# Patient Record
Sex: MALE | Race: WHITE | NOT HISPANIC OR LATINO | Employment: OTHER | URBAN - METROPOLITAN AREA
[De-identification: names, ages, dates, MRNs, and addresses within clinical notes are randomized per-mention and may not be internally consistent; named-entity substitution may affect disease eponyms.]

---

## 2017-02-06 ENCOUNTER — OFFICE VISIT (OUTPATIENT)
Dept: URGENT CARE | Facility: PHYSICIAN GROUP | Age: 47
End: 2017-02-06
Payer: COMMERCIAL

## 2017-02-06 VITALS
OXYGEN SATURATION: 96 % | TEMPERATURE: 99 F | DIASTOLIC BLOOD PRESSURE: 76 MMHG | WEIGHT: 250 LBS | SYSTOLIC BLOOD PRESSURE: 128 MMHG | BODY MASS INDEX: 34.88 KG/M2 | RESPIRATION RATE: 20 BRPM | HEART RATE: 94 BPM

## 2017-02-06 DIAGNOSIS — J06.9 VIRAL UPPER RESPIRATORY TRACT INFECTION: ICD-10-CM

## 2017-02-06 DIAGNOSIS — Z87.09 HISTORY OF ACUTE SINUSITIS: ICD-10-CM

## 2017-02-06 DIAGNOSIS — J45.31 MILD PERSISTENT ASTHMA WITH ACUTE EXACERBATION: ICD-10-CM

## 2017-02-06 DIAGNOSIS — J98.01 ACUTE BRONCHOSPASM DUE TO VIRAL INFECTION: ICD-10-CM

## 2017-02-06 DIAGNOSIS — B34.9 ACUTE BRONCHOSPASM DUE TO VIRAL INFECTION: ICD-10-CM

## 2017-02-06 PROCEDURE — 99214 OFFICE O/P EST MOD 30 MIN: CPT | Performed by: EMERGENCY MEDICINE

## 2017-02-06 RX ORDER — DOXYCYCLINE HYCLATE 100 MG
100 TABLET ORAL 2 TIMES DAILY
Qty: 14 TAB | Refills: 0 | Status: SHIPPED | OUTPATIENT
Start: 2017-02-06 | End: 2024-01-01

## 2017-02-06 RX ORDER — PREDNISONE 20 MG/1
40 TABLET ORAL DAILY
Qty: 14 TAB | Refills: 0 | Status: SHIPPED | OUTPATIENT
Start: 2017-02-06 | End: 2017-02-13

## 2017-02-06 RX ORDER — CODEINE PHOSPHATE AND GUAIFENESIN 10; 100 MG/5ML; MG/5ML
10 SOLUTION ORAL NIGHTLY PRN
Qty: 50 ML | Refills: 0 | Status: SHIPPED | OUTPATIENT
Start: 2017-02-06 | End: 2017-03-14 | Stop reason: SDUPTHER

## 2017-02-06 RX ORDER — BENZONATATE 100 MG/1
100 CAPSULE ORAL 3 TIMES DAILY PRN
Qty: 20 CAP | Refills: 0 | Status: SHIPPED | OUTPATIENT
Start: 2017-02-06

## 2017-02-06 ASSESSMENT — ENCOUNTER SYMPTOMS
HEMOPTYSIS: 0
SORE THROAT: 1
SHORTNESS OF BREATH: 0
COUGH: 1
ABDOMINAL PAIN: 0
CHILLS: 0
NAUSEA: 0
HEADACHES: 1
DIARRHEA: 0
WHEEZING: 1
RHINORRHEA: 1
SINUS PAIN: 1
VOMITING: 0
SPUTUM PRODUCTION: 1
MYALGIAS: 0

## 2017-02-06 NOTE — MR AVS SNAPSHOT
Tima Dedrick Ibrahima   2017 9:00 AM   Office Visit   MRN: [id number]    Department:  [facility]   Dept Phone:  [phone]    Description:  Male : 1970   Provider:  Ken Guerrero M.D.           Reason for Visit     Cough congestion, fever(subjective), body aches x 4 days      Allergies as of 2017     Allergen Noted Reactions    Pcn [Penicillins] 10/12/2009   Swelling      You were diagnosed with     Mild persistent asthma with acute exacerbation   [036708]       Acute bronchospasm due to viral infection   [080953]       Viral upper respiratory tract infection   [133655]       History of acute sinusitis   [638190]         Vital Signs     Blood Pressure Pulse Temperature Respirations Weight Oxygen Saturation    128/76 mmHg 94 37.2 °C (99 °F) 20 113.399 kg (250 lb) 96%    Smoking Status                   Former Smoker           Basic Information     Date Of Birth Sex Race Ethnicity Preferred Language    1970 Male White Non- English      Problem List              ICD-10-CM Priority Class Noted - Resolved    SVT (supraventricular tachycardia) (CMS-HCC) I47.1   2011 - Present    Elevated lipids E78.5   2011 - Present    Sleep apnea G47.30   2011 - Present    Personal history of prior ablation treatment (Chronic) Z98.890   Unknown - Present    Chest discomfort R07.89   2013 - Present    Dyslipidemia E78.5   2013 - Present    Family history of premature CAD Z82.49   2013 - Present    HTN (hypertension) I10   2013 - Present      Health Maintenance        Date Due Completion Dates    IMM INFLUENZA (1) 2016 ---    IMM DTaP/Tdap/Td Vaccine (2 - Td) 3/17/2024 3/17/2014            Current Immunizations     Tdap Vaccine 3/17/2014      Below and/or attached are the medications your provider expects you to take. Review all of your home medications and newly ordered medications with your provider and/or pharmacist. Follow medication instructions as  directed by your provider and/or pharmacist. Please keep your medication list with you and share with your provider. Update the information when medications are discontinued, doses are changed, or new medications (including over-the-counter products) are added; and carry medication information at all times in the event of emergency situations     Allergies:  PCN - Swelling               Medications  Valid as of: February 06, 2017 -  9:34 AM    Generic Name Brand Name Tablet Size Instructions for use    ALPRAZolam (Tab) XANAX 0.25 MG Take 1 Tab by mouth as needed for Anxiety.        ALPRAZolam (Tab) XANAX 0.5 MG         Atorvastatin Calcium (Tab) LIPITOR 10 MG Take 10 mg by mouth every evening.        Benzonatate (Cap) TESSALON 100 MG Take 1 Cap by mouth 3 times a day as needed.        Doxycycline Hyclate (Tab) VIBRAMYCIN 100 MG Take 1 Tab by mouth 2 times a day.        Doxycycline Hyclate (Tab) VIBRAMYCIN 100 MG Take 1 Tab by mouth 2 times a day.        Fluticasone-Salmeterol (AEROSOL POWDER, BREATH ACTIVATED) ADVAIR DISKUS 250-50 MCG/DOSE         GuaiFENesin (TABLET SR 12 HR) MUCINEX 600 MG Take 600 mg by mouth every 12 hours.        Guaifenesin-Codeine (Solution) ROBITUSSIN -10 mg/5mL Take 10 mL by mouth at bedtime as needed for Cough.        Melatonin   by Does not apply route every 48 hours as needed.        Olmesartan Medoxomil (Tab) BENICAR 20 MG         Omega-3 Fatty Acids   Take 2,000 mg by mouth every day.        PredniSONE (Tab) DELTASONE 20 MG Take 2 Tabs by mouth every day for 7 days.        Zolpidem Tartrate   Take  by mouth.        .                 Medicines prescribed today were sent to:     [id number] [place], [address] [place] AT [address] [facility] [address]    Phone: [phone] Fax: [phone]    Open 24 Hours?: No      Medication refill instructions:       If your prescription bottle indicates you have medication refills  left, it is not necessary to call your provider’s office. Please contact your pharmacy and they will refill your medication.    If your prescription bottle indicates you do not have any refills left, you may request refills at any time through one of the following ways: The online [employer] system (except Urgent Care), by calling your provider’s office, or by asking your pharmacy to contact your provider’s office with a refill request. Medication refills are processed only during regular business hours and may not be available until the next business day. Your provider may request additional information or to have a follow-up visit with you prior to refilling your medication.   *Please Note: Medication refills are assigned a new Rx number when refilled electronically. Your pharmacy may indicate that no refills were authorized even though a new prescription for the same medication is available at the pharmacy. Please request the medicine by name with the pharmacy before contacting your provider for a refill.        Instructions    Use over-the-counter oxymetazoline (Afrin) nasal spray as needed for up to 3 days only; follow package instructions for dosing.  Use saline nasal irrigation, such as with a Neti Pot, as needed daily for relief of nasal or sinus congestion relief.  Use the prescribed antibiotic only if symptoms persist beyond 7-10 days, or re-worsen. Use an oral probiotic daily, such as Culturelle, Align, or yogurt to reduce gastrointestinal symptoms.    Asthma, Acute Bronchospasm  Acute bronchospasm caused by asthma is also referred to as an asthma attack. Bronchospasm means your air passages become narrowed. The narrowing is caused by inflammation and tightening of the muscles in the air tubes (bronchi) in your lungs. This can make it hard to breathe or cause you to wheeze and cough.  CAUSES  Possible triggers are:  · Animal dander from the skin, hair, or feathers of animals.  · Dust mites contained in house  dust.  · Cockroaches.  · Pollen from trees or grass.  · Mold.  · Cigarette or tobacco smoke.  · Air pollutants such as dust, household , hair sprays, aerosol sprays, paint fumes, strong chemicals, or strong odors.  · Cold air or weather changes. Cold air may trigger inflammation. Winds increase molds and pollens in the air.  · Strong emotions such as crying or laughing hard.  · Stress.  · Certain medicines such as aspirin or beta-blockers.  · Sulfites in foods and drinks, such as dried fruits and wine.  · Infections or inflammatory conditions, such as a flu, cold, or inflammation of the nasal membranes (rhinitis).  · Gastroesophageal reflux disease (GERD). GERD is a condition where stomach acid backs up into your esophagus.  · Exercise or strenuous activity.  SIGNS AND SYMPTOMS   · Wheezing.  · Excessive coughing, particularly at night.  · Chest tightness.  · Shortness of breath.  DIAGNOSIS   Your health care provider will ask you about your medical history and perform a physical exam. A chest X-ray or blood testing may be performed to look for other causes of your symptoms or other conditions that may have triggered your asthma attack.   TREATMENT   Treatment is aimed at reducing inflammation and opening up the airways in your lungs.  Most asthma attacks are treated with inhaled medicines. These include quick relief or rescue medicines (such as bronchodilators) and controller medicines (such as inhaled corticosteroids). These medicines are sometimes given through an inhaler or a nebulizer. Systemic steroid medicine taken by mouth or given through an IV tube also can be used to reduce the inflammation when an attack is moderate or severe. Antibiotic medicines are only used if a bacterial infection is present.   HOME CARE INSTRUCTIONS   · Rest.  · Drink plenty of liquids. This helps the mucus to remain thin and be easily coughed up. Only use caffeine in moderation and do not use alcohol until you have  recovered from your illness.  · Do not smoke. Avoid being exposed to secondhand smoke.  · You play a critical role in keeping yourself in good health. Avoid exposure to things that cause you to wheeze or to have breathing problems.  · Keep your medicines up-to-date and available. Carefully follow your health care provider's treatment plan.  · Take your medicine exactly as prescribed.  · When pollen or pollution is bad, keep windows closed and use an air conditioner or go to places with air conditioning.  · Asthma requires careful medical care. See your health care provider for a follow-up as advised. If you are more than 24 weeks pregnant and you were prescribed any new medicines, let your obstetrician know about the visit and how you are doing. Follow up with your health care provider as directed.  · After you have recovered from your asthma attack, make an appointment with your outpatient doctor to talk about ways to reduce the likelihood of future attacks. If you do not have a doctor who manages your asthma, make an appointment with a primary care doctor to discuss your asthma.  SEEK IMMEDIATE MEDICAL CARE IF:   · You are getting worse.  · You have trouble breathing. If severe, call your local emergency services (911 in the [place]  · You develop chest pain or discomfort.  · You are vomiting.  · You are not able to keep fluids down.  · You are coughing up yellow, green, brown, or bloody sputum.  · You have a fever and your symptoms suddenly get worse.  · You have trouble swallowing.  MAKE SURE YOU:   · Understand these instructions.  · Will watch your condition.  · Will get help right away if you are not doing well or get worse.     This information is not intended to replace advice given to you by your health care provider. Make sure you discuss any questions you have with your health care provider.     Document Released: 04/03/2008 Document Revised: 12/23/2014 Document Reviewed: 06/25/2014  [employer]  Patient Education ©2016 Elsevier Inc.  Sinusitis, Adult  Sinusitis is redness, soreness, and inflammation of the paranasal sinuses. Paranasal sinuses are air pockets within the bones of your face. They are located beneath your eyes, in the middle of your forehead, and above your eyes. In healthy paranasal sinuses, mucus is able to drain out, and air is able to circulate through them by way of your nose. However, when your paranasal sinuses are inflamed, mucus and air can become trapped. This can allow bacteria and other germs to grow and cause infection.  Sinusitis can develop quickly and last only a short time (acute) or continue over a long period (chronic). Sinusitis that lasts for more than 12 weeks is considered chronic.  CAUSES  Causes of sinusitis include:  · Allergies.  · Structural abnormalities, such as displacement of the cartilage that separates your nostrils (deviated septum), which can decrease the air flow through your nose and sinuses and affect sinus drainage.  · Functional abnormalities, such as when the small hairs (cilia) that line your sinuses and help remove mucus do not work properly or are not present.  SIGNS AND SYMPTOMS  Symptoms of acute and chronic sinusitis are the same. The primary symptoms are pain and pressure around the affected sinuses. Other symptoms include:  · Upper toothache.  · Earache.  · Headache.  · Bad breath.  · Decreased sense of smell and taste.  · A cough, which worsens when you are lying flat.  · Fatigue.  · Fever.  · Thick drainage from your nose, which often is green and may contain pus (purulent).  · Swelling and warmth over the affected sinuses.  DIAGNOSIS  Your health care provider will perform a physical exam. During your exam, your health care provider may perform any of the following to help determine if you have acute sinusitis or chronic sinusitis:  · Look in your nose for signs of abnormal growths in your nostrils (nasal polyps).  · Tap over the affected  sinus to check for signs of infection.  · View the inside of your sinuses using an imaging device that has a light attached (endoscope).  If your health care provider suspects that you have chronic sinusitis, one or more of the following tests may be recommended:  · Allergy tests.  · Nasal culture. A sample of mucus is taken from your nose, sent to a lab, and screened for bacteria.  · Nasal cytology. A sample of mucus is taken from your nose and examined by your health care provider to determine if your sinusitis is related to an allergy.  TREATMENT  Most cases of acute sinusitis are related to a viral infection and will resolve on their own within 10 days. Sometimes, medicines are prescribed to help relieve symptoms of both acute and chronic sinusitis. These may include pain medicines, decongestants, nasal steroid sprays, or saline sprays.  However, for sinusitis related to a bacterial infection, your health care provider will prescribe antibiotic medicines. These are medicines that will help kill the bacteria causing the infection.  Rarely, sinusitis is caused by a fungal infection. In these cases, your health care provider will prescribe antifungal medicine.  For some cases of chronic sinusitis, surgery is needed. Generally, these are cases in which sinusitis recurs more than 3 times per year, despite other treatments.  HOME CARE INSTRUCTIONS  · Drink plenty of water. Water helps thin the mucus so your sinuses can drain more easily.  · Use a humidifier.  · Inhale steam 3-4 times a day (for example, sit in the bathroom with the shower running).  · Apply a warm, moist washcloth to your face 3-4 times a day, or as directed by your health care provider.  · Use saline nasal sprays to help moisten and clean your sinuses.  · Take medicines only as directed by your health care provider.  · If you were prescribed either an antibiotic or antifungal medicine, finish it all even if you start to feel better.  SEEK IMMEDIATE  MEDICAL CARE IF:  · You have increasing pain or severe headaches.  · You have nausea, vomiting, or drowsiness.  · You have swelling around your face.  · You have vision problems.  · You have a stiff neck.  · You have difficulty breathing.     This information is not intended to replace advice given to you by your health care provider. Make sure you discuss any questions you have with your health care provider.     Document Released: 12/18/2006 Document Revised: 01/08/2016 Document Reviewed: 01/01/2013  [employer] Interactive Patient Education ©2016 Elsevier Inc.            eTec Access Code: [email or web address]  Expires: 3/8/2017  9:34 AM    eTec  A secure, online tool to manage your health information     [employer]’s eTec® is a secure, online tool that connects you to your personalized health information from the privacy of your home -- day or night - making it very easy for you to manage your healthcare. Once the activation process is completed, you can even access your medical information using the eTec anny, which is available for free in the [employer] Anny store or Google Play store.     eTec provides the following levels of access (as shown below):   My Chart Features   [facility] Doctor [facility]  Specialists [facility]  [facility]  Primary Care  Doctor   Email your healthcare team securely and privately 24/7 X X X    Manage appointments: schedule your next appointment; view details of past/upcoming appointments X      Request prescription refills. X      View recent personal medical records, including lab and immunizations X X X X   View health record, including health history, allergies, medications X X X X   Read reports about your outpatient visits, procedures, consult and ER notes X X X X   See your discharge summary, which is a recap of your hospital and/or ER visit that includes your diagnosis, lab results, and care plan. X X       How to register for eTec:  1. Go to   [email or web address].  2. Click on the Sign Up Now box, which takes you to the New Member Sign Up page. You will need to provide the following information:  a. Enter your [employer] Access Code exactly as it appears at the top of this page. (You will not need to use this code after you’ve completed the sign-up process. If you do not sign up before the expiration date, you must request a new code.)   b. Enter your date of birth.   c. Enter your home email address.   d. Click Submit, and follow the next screen’s instructions.  3. Create a [employer] ID. This will be your [employer] login ID and cannot be changed, so think of one that is secure and easy to remember.  4. Create a [employer] password. You can change your password at any time.  5. Enter your Password Reset Question and Answer. This can be used at a later time if you forget your password.   6. Enter your e-mail address. This allows you to receive e-mail notifications when new information is available in [employer].  7. Click Sign Up. You can now view your health information.    For assistance activating your [employer] account, call [phone]

## 2017-02-06 NOTE — PROGRESS NOTES
Subjective:      Tima Alexandra is a 46 y.o. male who presents with Cough            URI   This is a new problem. The current episode started in the past 7 days. The problem has been gradually worsening. Maximum temperature: subjective. Associated symptoms include congestion, coughing, headaches, a plugged ear sensation, rhinorrhea, sinus pain, a sore throat and wheezing. Pertinent negatives include no abdominal pain, chest pain, diarrhea, ear pain, nausea, rash or vomiting. He has tried inhaler use for the symptoms. The treatment provided moderate relief.       Review of Systems   Constitutional: Positive for malaise/fatigue. Negative for chills.   HENT: Positive for congestion, rhinorrhea and sore throat. Negative for ear pain and nosebleeds.    Respiratory: Positive for cough, sputum production and wheezing. Negative for hemoptysis and shortness of breath.    Cardiovascular: Negative for chest pain.   Gastrointestinal: Negative for nausea, vomiting, abdominal pain and diarrhea.   Musculoskeletal: Negative for myalgias.   Skin: Negative for rash.   Neurological: Positive for headaches.     PMH:  has a past medical history of Snoring; PSVT (paroxysmal supraventricular tachycardia) (CMS-HCC); ASTHMA; Bronchitis; Sleep apnea; Hypertension; Personal history of prior ablation treatment; SVT (supraventricular tachycardia) (CMS-[facility]) (7/21/2011); Elevated lipids (7/21/2011); Chest discomfort (6/24/2013); Dyslipidemia (6/24/2013); Family history of premature CAD (6/24/2013); and HTN (hypertension) (6/24/2013). He also has no past medical history of GERD (gastroesophageal reflux disease) or Ulcer (CMS-HCC).  MEDS:   Current outpatient prescriptions:   •  predniSONE (DELTASONE) 20 MG Tab, Take 2 Tabs by mouth every day for 7 days., Disp: 14 Tab, Rfl: 0  •  benzonatate (TESSALON) 100 MG Cap, Take 1 Cap by mouth 3 times a day as needed., Disp: 20 Cap, Rfl: 0  •  guaifenesin-codeine (ROBITUSSIN AC) Solution oral solution,  Take 10 mL by mouth at bedtime as needed for Cough., Disp: 50 mL, Rfl: 0  •  doxycycline (VIBRAMYCIN) 100 MG Tab, Take 1 Tab by mouth 2 times a day., Disp: 14 Tab, Rfl: 0  •  BENICAR 20 MG Tab, , Disp: , Rfl: 9  •  ADVAIR DISKUS 250-50 MCG/DOSE AEROSOL POWDER, BREATH ACTIVATED, , Disp: , Rfl: 3  •  atorvastatin (LIPITOR) 10 MG TABS, Take 10 mg by mouth every evening., Disp: , Rfl:   •  alprazolam (XANAX) 0.5 MG Tab, , Disp: , Rfl: 0  •  doxycycline (VIBRAMYCIN) 100 MG Tab, Take 1 Tab by mouth 2 times a day., Disp: 20 Tab, Rfl: 0  •  guaifenesin LA (MUCINEX) 600 MG TABLET SR 12 HR, Take 600 mg by mouth every 12 hours., Disp: , Rfl:   •  alprazolam (XANAX) 0.25 MG TABS, Take 1 Tab by mouth as needed for Anxiety., Disp: 10 Tab, Rfl: 0  •  MELATONIN, by Does not apply route every 48 hours as needed., Disp: , Rfl:   •  Omega-3 Fatty Acids (FISH OIL PO), Take 2,000 mg by mouth every day., Disp: , Rfl:   •  Zolpidem Tartrate (AMBIEN PO), Take  by mouth., Disp: , Rfl:   ALLERGIES:   Allergies   Allergen Reactions   • Pcn [Penicillins] Swelling     SURGHX:   Past Surgical History   Procedure Laterality Date   • Other  2009     vasectomy   • Recovery  8/12/2011     Performed by SURGERY, CATH-RECOVERY at SURGERY SAME DAY [facility]     SOCHX:  reports that he quit smoking about 3 years ago. His smoking use included Cigarettes. He has a .75 pack-year smoking history. He quit smokeless tobacco use about 3 years ago. His smokeless tobacco use included Chew. He reports that he drinks alcohol. He reports that he does not use illicit drugs.  FH: family history is not on file.       Objective:     /76 mmHg  Pulse 94  Temp(Src) 37.2 °C (99 °F)  Resp 20  Wt 113.399 kg (250 lb)  SpO2 96%     Physical Exam   Constitutional: He appears well-developed and well-nourished. He is cooperative.  Non-toxic appearance. He does not appear ill. No distress.   HENT:   Head: Normocephalic.   Right Ear: Tympanic membrane and ear canal  normal.   Left Ear: Tympanic membrane and ear canal normal.   Nose: Mucosal edema and rhinorrhea present. Right sinus exhibits maxillary sinus tenderness. Left sinus exhibits maxillary sinus tenderness.   Mouth/Throat: Uvula is midline. No trismus in the jaw. No oropharyngeal exudate, posterior oropharyngeal edema or posterior oropharyngeal erythema.   Eyes: Conjunctivae are normal.   Neck: Trachea normal. Neck supple. No JVD present.   Cardiovascular: Normal rate, regular rhythm and normal heart sounds.    Pulmonary/Chest: Effort normal. He has no decreased breath sounds. He has no wheezes. He has no rhonchi. He has no rales.   Musculoskeletal:   No significant pedal edema   Lymphadenopathy:     He has no cervical adenopathy.   Neurological: He is alert.   Skin: Skin is warm and dry.   Psychiatric: He has a normal mood and affect.               Assessment/Plan:     1. Mild persistent asthma with acute exacerbation  Continue albuterol and Advair  - predniSONE (DELTASONE) 20 MG Tab; Take 2 Tabs by mouth every day for 7 days.  Dispense: 14 Tab; Refill: 0    2. Acute bronchospasm due to viral infection  - benzonatate (TESSALON) 100 MG Cap; Take 1 Cap by mouth 3 times a day as needed.  Dispense: 20 Cap; Refill: 0  - guaifenesin-codeine (ROBITUSSIN AC) Solution oral solution; Take 10 mL by mouth at bedtime as needed for Cough.  Dispense: 50 mL; Refill: 0    3. Viral upper respiratory tract infection  Supportive care.    4. History of acute sinusitis  Wait and see ATB provided due to hx  - doxycycline (VIBRAMYCIN) 100 MG Tab; Take 1 Tab by mouth 2 times a day.  Dispense: 14 Tab; Refill: 0

## 2017-02-06 NOTE — PATIENT INSTRUCTIONS
Use over-the-counter oxymetazoline (Afrin) nasal spray as needed for up to 3 days only; follow package instructions for dosing.  Use saline nasal irrigation, such as with a Neti Pot, as needed daily for relief of nasal or sinus congestion relief.  Use the prescribed antibiotic only if symptoms persist beyond 7-10 days, or re-worsen. Use an oral probiotic daily, such as Culturelle, Align, or yogurt to reduce gastrointestinal symptoms.    Asthma, Acute Bronchospasm  Acute bronchospasm caused by asthma is also referred to as an asthma attack. Bronchospasm means your air passages become narrowed. The narrowing is caused by inflammation and tightening of the muscles in the air tubes (bronchi) in your lungs. This can make it hard to breathe or cause you to wheeze and cough.  CAUSES  Possible triggers are:  · Animal dander from the skin, hair, or feathers of animals.  · Dust mites contained in house dust.  · Cockroaches.  · Pollen from trees or grass.  · Mold.  · Cigarette or tobacco smoke.  · Air pollutants such as dust, household , hair sprays, aerosol sprays, paint fumes, strong chemicals, or strong odors.  · Cold air or weather changes. Cold air may trigger inflammation. Winds increase molds and pollens in the air.  · Strong emotions such as crying or laughing hard.  · Stress.  · Certain medicines such as aspirin or beta-blockers.  · Sulfites in foods and drinks, such as dried fruits and wine.  · Infections or inflammatory conditions, such as a flu, cold, or inflammation of the nasal membranes (rhinitis).  · Gastroesophageal reflux disease (GERD). GERD is a condition where stomach acid backs up into your esophagus.  · Exercise or strenuous activity.  SIGNS AND SYMPTOMS   · Wheezing.  · Excessive coughing, particularly at night.  · Chest tightness.  · Shortness of breath.  DIAGNOSIS   Your health care provider will ask you about your medical history and perform a physical exam. A chest X-ray or blood testing may  be performed to look for other causes of your symptoms or other conditions that may have triggered your asthma attack.   TREATMENT   Treatment is aimed at reducing inflammation and opening up the airways in your lungs.  Most asthma attacks are treated with inhaled medicines. These include quick relief or rescue medicines (such as bronchodilators) and controller medicines (such as inhaled corticosteroids). These medicines are sometimes given through an inhaler or a nebulizer. Systemic steroid medicine taken by mouth or given through an IV tube also can be used to reduce the inflammation when an attack is moderate or severe. Antibiotic medicines are only used if a bacterial infection is present.   HOME CARE INSTRUCTIONS   · Rest.  · Drink plenty of liquids. This helps the mucus to remain thin and be easily coughed up. Only use caffeine in moderation and do not use alcohol until you have recovered from your illness.  · Do not smoke. Avoid being exposed to secondhand smoke.  · You play a critical role in keeping yourself in good health. Avoid exposure to things that cause you to wheeze or to have breathing problems.  · Keep your medicines up-to-date and available. Carefully follow your health care provider's treatment plan.  · Take your medicine exactly as prescribed.  · When pollen or pollution is bad, keep windows closed and use an air conditioner or go to places with air conditioning.  · Asthma requires careful medical care. See your health care provider for a follow-up as advised. If you are more than 24 weeks pregnant and you were prescribed any new medicines, let your obstetrician know about the visit and how you are doing. Follow up with your health care provider as directed.  · After you have recovered from your asthma attack, make an appointment with your outpatient doctor to talk about ways to reduce the likelihood of future attacks. If you do not have a doctor who manages your asthma, make an appointment with  a primary care doctor to discuss your asthma.  SEEK IMMEDIATE MEDICAL CARE IF:   · You are getting worse.  · You have trouble breathing. If severe, call your local emergency services (911 in the [place]  · You develop chest pain or discomfort.  · You are vomiting.  · You are not able to keep fluids down.  · You are coughing up yellow, green, brown, or bloody sputum.  · You have a fever and your symptoms suddenly get worse.  · You have trouble swallowing.  MAKE SURE YOU:   · Understand these instructions.  · Will watch your condition.  · Will get help right away if you are not doing well or get worse.     This information is not intended to replace advice given to you by your health care provider. Make sure you discuss any questions you have with your health care provider.     Document Released: 04/03/2008 Document Revised: 12/23/2014 Document Reviewed: 06/25/2014  [employer] Interactive Patient Education ©2016 [employer]  Sinusitis, Adult  Sinusitis is redness, soreness, and inflammation of the paranasal sinuses. Paranasal sinuses are air pockets within the bones of your face. They are located beneath your eyes, in the middle of your forehead, and above your eyes. In healthy paranasal sinuses, mucus is able to drain out, and air is able to circulate through them by way of your nose. However, when your paranasal sinuses are inflamed, mucus and air can become trapped. This can allow bacteria and other germs to grow and cause infection.  Sinusitis can develop quickly and last only a short time (acute) or continue over a long period (chronic). Sinusitis that lasts for more than 12 weeks is considered chronic.  CAUSES  Causes of sinusitis include:  · Allergies.  · Structural abnormalities, such as displacement of the cartilage that separates your nostrils (deviated septum), which can decrease the air flow through your nose and sinuses and affect sinus drainage.  · Functional abnormalities, such as when the small hairs  (cilia) that line your sinuses and help remove mucus do not work properly or are not present.  SIGNS AND SYMPTOMS  Symptoms of acute and chronic sinusitis are the same. The primary symptoms are pain and pressure around the affected sinuses. Other symptoms include:  · Upper toothache.  · Earache.  · Headache.  · Bad breath.  · Decreased sense of smell and taste.  · A cough, which worsens when you are lying flat.  · Fatigue.  · Fever.  · Thick drainage from your nose, which often is green and may contain pus (purulent).  · Swelling and warmth over the affected sinuses.  DIAGNOSIS  Your health care provider will perform a physical exam. During your exam, your health care provider may perform any of the following to help determine if you have acute sinusitis or chronic sinusitis:  · Look in your nose for signs of abnormal growths in your nostrils (nasal polyps).  · Tap over the affected sinus to check for signs of infection.  · View the inside of your sinuses using an imaging device that has a light attached (endoscope).  If your health care provider suspects that you have chronic sinusitis, one or more of the following tests may be recommended:  · Allergy tests.  · Nasal culture. A sample of mucus is taken from your nose, sent to a lab, and screened for bacteria.  · Nasal cytology. A sample of mucus is taken from your nose and examined by your health care provider to determine if your sinusitis is related to an allergy.  TREATMENT  Most cases of acute sinusitis are related to a viral infection and will resolve on their own within 10 days. Sometimes, medicines are prescribed to help relieve symptoms of both acute and chronic sinusitis. These may include pain medicines, decongestants, nasal steroid sprays, or saline sprays.  However, for sinusitis related to a bacterial infection, your health care provider will prescribe antibiotic medicines. These are medicines that will help kill the bacteria causing the  infection.  Rarely, sinusitis is caused by a fungal infection. In these cases, your health care provider will prescribe antifungal medicine.  For some cases of chronic sinusitis, surgery is needed. Generally, these are cases in which sinusitis recurs more than 3 times per year, despite other treatments.  HOME CARE INSTRUCTIONS  · Drink plenty of water. Water helps thin the mucus so your sinuses can drain more easily.  · Use a humidifier.  · Inhale steam 3-4 times a day (for example, sit in the bathroom with the shower running).  · Apply a warm, moist washcloth to your face 3-4 times a day, or as directed by your health care provider.  · Use saline nasal sprays to help moisten and clean your sinuses.  · Take medicines only as directed by your health care provider.  · If you were prescribed either an antibiotic or antifungal medicine, finish it all even if you start to feel better.  SEEK IMMEDIATE MEDICAL CARE IF:  · You have increasing pain or severe headaches.  · You have nausea, vomiting, or drowsiness.  · You have swelling around your face.  · You have vision problems.  · You have a stiff neck.  · You have difficulty breathing.     This information is not intended to replace advice given to you by your health care provider. Make sure you discuss any questions you have with your health care provider.     Document Released: 12/18/2006 Document Revised: 01/08/2016 Document Reviewed: 01/01/2013  [employer] Interactive Patient Education ©2016 [employer]

## 2017-03-14 ENCOUNTER — OFFICE VISIT (OUTPATIENT)
Dept: URGENT CARE | Facility: PHYSICIAN GROUP | Age: 47
End: 2017-03-14
Payer: COMMERCIAL

## 2017-03-14 VITALS
SYSTOLIC BLOOD PRESSURE: 128 MMHG | TEMPERATURE: 98.4 F | RESPIRATION RATE: 20 BRPM | BODY MASS INDEX: 34.88 KG/M2 | OXYGEN SATURATION: 95 % | WEIGHT: 250 LBS | DIASTOLIC BLOOD PRESSURE: 86 MMHG | HEART RATE: 116 BPM

## 2017-03-14 DIAGNOSIS — J45.41 MODERATE PERSISTENT ASTHMA WITH ACUTE EXACERBATION: ICD-10-CM

## 2017-03-14 DIAGNOSIS — J06.9 VIRAL UPPER RESPIRATORY TRACT INFECTION: ICD-10-CM

## 2017-03-14 DIAGNOSIS — B34.9 ACUTE BRONCHOSPASM DUE TO VIRAL INFECTION: ICD-10-CM

## 2017-03-14 DIAGNOSIS — J98.01 ACUTE BRONCHOSPASM DUE TO VIRAL INFECTION: ICD-10-CM

## 2017-03-14 DIAGNOSIS — R05.9 COUGH: ICD-10-CM

## 2017-03-14 PROCEDURE — 99214 OFFICE O/P EST MOD 30 MIN: CPT | Performed by: EMERGENCY MEDICINE

## 2017-03-14 RX ORDER — PREDNISONE 20 MG/1
40 TABLET ORAL DAILY
Qty: 14 TAB | Refills: 0 | Status: SHIPPED | OUTPATIENT
Start: 2017-03-14 | End: 2017-03-21

## 2017-03-14 RX ORDER — CODEINE PHOSPHATE AND GUAIFENESIN 10; 100 MG/5ML; MG/5ML
10 SOLUTION ORAL NIGHTLY PRN
Qty: 70 ML | Refills: 0 | Status: SHIPPED | OUTPATIENT
Start: 2017-03-14

## 2017-03-14 ASSESSMENT — ENCOUNTER SYMPTOMS
FEVER: 0
COUGH: 1
SORE THROAT: 1
HEADACHES: 1
SHORTNESS OF BREATH: 1
HEMOPTYSIS: 0
SPUTUM PRODUCTION: 0
VOMITING: 0
DIARRHEA: 0
WHEEZING: 1
NAUSEA: 0
SINUS PAIN: 1
CHILLS: 1
ABDOMINAL PAIN: 0
RHINORRHEA: 1

## 2017-03-14 NOTE — PROGRESS NOTES
Subjective:      Tima Alexandra is a 46 y.o. male who presents with Cough            URI   This is a recurrent problem. Episode onset: 4 days. The problem has been gradually worsening. There has been no fever. Associated symptoms include congestion, coughing, headaches, rhinorrhea, sinus pain, a sore throat and wheezing. Pertinent negatives include no abdominal pain, chest pain, diarrhea, ear pain, nausea, plugged ear sensation, rash or vomiting.    notes ran out of Advair yesterday, last dose of albuterol a few hours ago.    Review of Systems   Constitutional: Positive for chills and malaise/fatigue. Negative for fever.   HENT: Positive for congestion, rhinorrhea and sore throat. Negative for ear discharge, ear pain, hearing loss and nosebleeds.    Respiratory: Positive for cough, shortness of breath and wheezing. Negative for hemoptysis and sputum production.    Cardiovascular: Negative for chest pain and leg swelling.   Gastrointestinal: Negative for nausea, vomiting, abdominal pain and diarrhea.   Skin: Negative for rash.   Neurological: Positive for headaches.     PMH:  has a past medical history of Snoring; PSVT (paroxysmal supraventricular tachycardia) (CMS-HCC); ASTHMA; Bronchitis; Sleep apnea; Hypertension; Personal history of prior ablation treatment; SVT (supraventricular tachycardia) (CMS-HCC) (7/21/2011); Elevated lipids (7/21/2011); Chest discomfort (6/24/2013); Dyslipidemia (6/24/2013); Family history of premature CAD (6/24/2013); and HTN (hypertension) (6/24/2013). He also has no past medical history of GERD (gastroesophageal reflux disease) or Ulcer (CMS-HCC).  MEDS:   Current outpatient prescriptions:   •  guaifenesin-codeine (ROBITUSSIN AC) Solution oral solution, Take 10 mL by mouth at bedtime as needed for Cough., Disp: 70 mL, Rfl: 0  •  ADVAIR DISKUS 250-50 MCG/DOSE AEROSOL POWDER, BREATH ACTIVATED, Inhale 1 Puff by mouth 2 times a day., Disp: 1 Inhaler, Rfl: 3  •  Albuterol Sulfate 108  (90 BASE) MCG/ACT AEROSOL POWDER, BREATH ACTIVATED, Inhale 1-2 Puffs by mouth every 6 hours as needed (coughing, wheezing)., Disp: 1 Each, Rfl: 0  •  predniSONE (DELTASONE) 20 MG Tab, Take 2 Tabs by mouth every day for 7 days., Disp: 14 Tab, Rfl: 0  •  BENICAR 20 MG Tab, , Disp: , Rfl: 9  •  atorvastatin (LIPITOR) 10 MG TABS, Take 10 mg by mouth every evening., Disp: , Rfl:   •  Omega-3 Fatty Acids (FISH OIL PO), Take 2,000 mg by mouth every day., Disp: , Rfl:   •  benzonatate (TESSALON) 100 MG Cap, Take 1 Cap by mouth 3 times a day as needed., Disp: 20 Cap, Rfl: 0  •  doxycycline (VIBRAMYCIN) 100 MG Tab, Take 1 Tab by mouth 2 times a day., Disp: 14 Tab, Rfl: 0  •  alprazolam (XANAX) 0.5 MG Tab, , Disp: , Rfl: 0  •  doxycycline (VIBRAMYCIN) 100 MG Tab, Take 1 Tab by mouth 2 times a day., Disp: 20 Tab, Rfl: 0  •  guaifenesin LA (MUCINEX) 600 MG TABLET SR 12 HR, Take 600 mg by mouth every 12 hours., Disp: , Rfl:   •  alprazolam (XANAX) 0.25 MG TABS, Take 1 Tab by mouth as needed for Anxiety., Disp: 10 Tab, Rfl: 0  •  MELATONIN, by Does not apply route every 48 hours as needed., Disp: , Rfl:   •  Zolpidem Tartrate (AMBIEN PO), Take  by mouth., Disp: , Rfl:   ALLERGIES:   Allergies   Allergen Reactions   • Pcn [Penicillins] Swelling     SURGHX:   Past Surgical History   Procedure Laterality Date   • Other  2009     vasectomy   • Recovery  8/12/2011     Performed by SURGERY, CATH-RECOVERY at SURGERY SAME DAY [facility] ORS     SOCHX:  reports that he quit smoking about 3 years ago. His smoking use included Cigarettes. He has a .75 pack-year smoking history. He quit smokeless tobacco use about 3 years ago. His smokeless tobacco use included Chew. He reports that he drinks alcohol. He reports that he does not use illicit drugs.  FH: family history is not on file.       Objective:     /86 mmHg  Pulse 116  Temp(Src) 36.9 °C (98.4 °F)  Resp 20  Wt 113.399 kg (250 lb)  SpO2 95%     Physical Exam   Constitutional: He  appears well-developed and well-nourished. He is cooperative.  Non-toxic appearance. He does not appear ill. No distress.   HENT:   Head: Normocephalic.   Right Ear: Tympanic membrane and ear canal normal.   Left Ear: Tympanic membrane and ear canal normal.   Nose: Mucosal edema and rhinorrhea present.   Mouth/Throat: Uvula is midline. No trismus in the jaw. No uvula swelling. Posterior oropharyngeal erythema present. No oropharyngeal exudate or posterior oropharyngeal edema.   Eyes: Conjunctivae are normal.   Neck: Trachea normal. Neck supple. No JVD present.   Cardiovascular: Regular rhythm and normal heart sounds.  Tachycardia present.    No murmur heard.  Pulmonary/Chest: No accessory muscle usage. No tachypnea. No respiratory distress. He has no decreased breath sounds. He has wheezes. He has rhonchi. He has no rales.   Rhonchi clear with cough.   Musculoskeletal:   No significant pedal edema.   Lymphadenopathy:     He has no cervical adenopathy.   Neurological: He is alert.   Skin: Skin is warm and dry.   Psychiatric: He has a normal mood and affect.               Assessment/Plan:     1. Moderate persistent asthma with acute exacerbation  - ADVAIR DISKUS 250-50 MCG/DOSE AEROSOL POWDER, BREATH ACTIVATED; Inhale 1 Puff by mouth 2 times a day.  Dispense: 1 Inhaler; Refill: 3  - Albuterol Sulfate 108 (90 BASE) MCG/ACT AEROSOL POWDER, BREATH ACTIVATED; Inhale 1-2 Puffs by mouth every 6 hours as needed (coughing, wheezing).  Dispense: 1 Each; Refill: 0  - predniSONE (DELTASONE) 20 MG Tab; Take 2 Tabs by mouth every day for 7 days.  Dispense: 14 Tab; Refill: 0    2. Acute bronchospasm due to viral infection  - ADVAIR DISKUS 250-50 MCG/DOSE AEROSOL POWDER, BREATH ACTIVATED; Inhale 1 Puff by mouth 2 times a day.  Dispense: 1 Inhaler; Refill: 3  - Albuterol Sulfate 108 (90 BASE) MCG/ACT AEROSOL POWDER, BREATH ACTIVATED; Inhale 1-2 Puffs by mouth every 6 hours as needed (coughing, wheezing).  Dispense: 1 Each; Refill:  0  - predniSONE (DELTASONE) 20 MG Tab; Take 2 Tabs by mouth every day for 7 days.  Dispense: 14 Tab; Refill: 0    3. Viral upper respiratory tract infection  Supportive care.    4. Cough  - guaifenesin-codeine (ROBITUSSIN AC) Solution oral solution; Take 10 mL by mouth at bedtime as needed for Cough.  Dispense: 70 mL; Refill: 0

## 2017-03-14 NOTE — MR AVS SNAPSHOT
Tima Dedrick Ibrahima   3/14/2017 4:00 PM   Office Visit   MRN: [id number]    Department:  [facility]   Dept Phone:  [phone]    Description:  Male : 1970   Provider:  Ken Guerrero M.D.           Reason for Visit     Cough congestion x 4 days      Allergies as of 3/14/2017     Allergen Noted Reactions    Pcn [Penicillins] 10/12/2009   Swelling      You were diagnosed with     Moderate persistent asthma with acute exacerbation   [3280197]       Acute bronchospasm due to viral infection   [643822]       Viral upper respiratory tract infection   [257958]       Cough   [786.2.ICD-9-CM]         Vital Signs     Blood Pressure Pulse Temperature Respirations Weight Oxygen Saturation    128/86 mmHg 116 36.9 °C (98.4 °F) 20 113.399 kg (250 lb) 95%    Smoking Status                   Former Smoker           Basic Information     Date Of Birth Sex Race Ethnicity Preferred Language    1970 Male White Non- English      Problem List              ICD-10-CM Priority Class Noted - Resolved    SVT (supraventricular tachycardia) (CMS-HCC) I47.1   2011 - Present    Elevated lipids E78.5   2011 - Present    Sleep apnea G47.30   2011 - Present    Personal history of prior ablation treatment (Chronic) Z98.890   Unknown - Present    Chest discomfort R07.89   2013 - Present    Dyslipidemia E78.5   2013 - Present    Family history of premature CAD Z82.49   2013 - Present    HTN (hypertension) I10   2013 - Present      Health Maintenance        Date Due Completion Dates    IMM INFLUENZA (1) 2016 ---    IMM DTaP/Tdap/Td Vaccine (2 - Td) 3/17/2024 3/17/2014            Current Immunizations     Tdap Vaccine 3/17/2014      Below and/or attached are the medications your provider expects you to take. Review all of your home medications and newly ordered medications with your provider and/or pharmacist. Follow medication instructions as directed by your provider and/or  pharmacist. Please keep your medication list with you and share with your provider. Update the information when medications are discontinued, doses are changed, or new medications (including over-the-counter products) are added; and carry medication information at all times in the event of emergency situations     Allergies:  PCN - Swelling               Medications  Valid as of: March 14, 2017 -  4:35 PM    Generic Name Brand Name Tablet Size Instructions for use    Albuterol Sulfate (AEROSOL POWDER, BREATH ACTIVATED) Albuterol Sulfate 108 (90 BASE) MCG/ACT Inhale 1-2 Puffs by mouth every 6 hours as needed (coughing, wheezing).        ALPRAZolam (Tab) XANAX 0.25 MG Take 1 Tab by mouth as needed for Anxiety.        ALPRAZolam (Tab) XANAX 0.5 MG         Atorvastatin Calcium (Tab) LIPITOR 10 MG Take 10 mg by mouth every evening.        Benzonatate (Cap) TESSALON 100 MG Take 1 Cap by mouth 3 times a day as needed.        Doxycycline Hyclate (Tab) VIBRAMYCIN 100 MG Take 1 Tab by mouth 2 times a day.        Doxycycline Hyclate (Tab) VIBRAMYCIN 100 MG Take 1 Tab by mouth 2 times a day.        Fluticasone-Salmeterol (AEROSOL POWDER, BREATH ACTIVATED) ADVAIR DISKUS 250-50 MCG/DOSE Inhale 1 Puff by mouth 2 times a day.        GuaiFENesin (TABLET SR 12 HR) MUCINEX 600 MG Take 600 mg by mouth every 12 hours.        Guaifenesin-Codeine (Solution) ROBITUSSIN -10 mg/5mL Take 10 mL by mouth at bedtime as needed for Cough.        Melatonin   by Does not apply route every 48 hours as needed.        Olmesartan Medoxomil (Tab) BENICAR 20 MG         Omega-3 Fatty Acids   Take 2,000 mg by mouth every day.        PredniSONE (Tab) DELTASONE 20 MG Take 2 Tabs by mouth every day for 7 days.        Zolpidem Tartrate   Take  by mouth.        .                 Medicines prescribed today were sent to:     [id number] - [place], [place] - [address] AT [facility]    [address] [facility] [place] [address]   "   Phone: [phone] Fax: [phone]    Open 24 Hours?: No      Medication refill instructions:       If your prescription bottle indicates you have medication refills left, it is not necessary to call your provider’s office. Please contact your pharmacy and they will refill your medication.    If your prescription bottle indicates you do not have any refills left, you may request refills at any time through one of the following ways: The online [employer] system (except Urgent Care), by calling your provider’s office, or by asking your pharmacy to contact your provider’s office with a refill request. Medication refills are processed only during regular business hours and may not be available until the next business day. Your provider may request additional information or to have a follow-up visit with you prior to refilling your medication.   *Please Note: Medication refills are assigned a new Rx number when refilled electronically. Your pharmacy may indicate that no refills were authorized even though a new prescription for the same medication is available at the pharmacy. Please request the medicine by name with the pharmacy before contacting your provider for a refill.        Instructions    Go to the nearest hospital Emergency Department, or call 911, if any worsening condition.    Upper Respiratory Infection, Adult  Most upper respiratory infections (URIs) are caused by a virus. A URI affects the nose, throat, and upper air passages. The most common type of URI is often called \"the common cold.\"  HOME CARE   · Take medicines only as told by your doctor.  · Gargle warm saltwater or take cough drops to comfort your throat as told by your doctor.  · Use a warm mist humidifier or inhale steam from a shower to increase air moisture. This may make it easier to breathe.  · Drink enough fluid to keep your pee (urine) clear or pale yellow.  · Eat soups and other clear broths.  · Have a healthy diet.  · Rest as " needed.  · Go back to work when your fever is gone or your doctor says it is okay.  ¨ You may need to stay home longer to avoid giving your URI to others.  ¨ You can also wear a face mask and wash your hands often to prevent spread of the virus.  · Use your inhaler more if you have asthma.  · Do not use any tobacco products, including cigarettes, chewing tobacco, or electronic cigarettes. If you need help quitting, ask your doctor.  GET HELP IF:  · You are getting worse, not better.  · Your symptoms are not helped by medicine.  · You have chills.  · You are getting more short of breath.  · You have brown or red mucus.  · You have yellow or brown discharge from your nose.  · You have pain in your face, especially when you bend forward.  · You have a fever.  · You have puffy (swollen) neck glands.  · You have pain while swallowing.  · You have white areas in the back of your throat.  GET HELP RIGHT AWAY IF:   · You have very bad or constant:  ¨ Headache.  ¨ Ear pain.  ¨ Pain in your forehead, behind your eyes, and over your cheekbones (sinus pain).  ¨ Chest pain.  · You have long-lasting (chronic) lung disease and any of the following:  ¨ Wheezing.  ¨ Long-lasting cough.  ¨ Coughing up blood.  ¨ A change in your usual mucus.  · You have a stiff neck.  · You have changes in your:  ¨ Vision.  ¨ Hearing.  ¨ Thinking.  ¨ Mood.  MAKE SURE YOU:   · Understand these instructions.  · Will watch your condition.  · Will get help right away if you are not doing well or get worse.     This information is not intended to replace advice given to you by your health care provider. Make sure you discuss any questions you have with your health care provider.     Document Released: 06/05/2009 Document Revised: 05/03/2016 Document Reviewed: 03/25/2015  [employer] Interactive Patient Education ©2016 [employer]  Asthma, Adult  Asthma is a recurring condition in which the airways tighten and narrow. Asthma can make it difficult to breathe.  It can cause coughing, wheezing, and shortness of breath. Asthma episodes, also called asthma attacks, range from minor to life-threatening. Asthma cannot be cured, but medicines and lifestyle changes can help control it.  CAUSES  Asthma is believed to be caused by inherited (genetic) and environmental factors, but its exact cause is unknown. Asthma may be triggered by allergens, lung infections, or irritants in the air. Asthma triggers are different for each person. Common triggers include:   · Animal dander.  · Dust mites.  · Cockroaches.  · Pollen from trees or grass.  · Mold.  · Smoke.  · Air pollutants such as dust, household , hair sprays, aerosol sprays, paint fumes, strong chemicals, or strong odors.  · Cold air, weather changes, and winds (which increase molds and pollens in the air).  · Strong emotional expressions such as crying or laughing hard.  · Stress.  · Certain medicines (such as aspirin) or types of drugs (such as beta-blockers).  · Sulfites in foods and drinks. Foods and drinks that may contain sulfites include dried fruit, potato chips, and sparkling grape juice.  · Infections or inflammatory conditions such as the flu, a cold, or an inflammation of the nasal membranes (rhinitis).  · Gastroesophageal reflux disease (GERD).  · Exercise or strenuous activity.  SYMPTOMS  Symptoms may occur immediately after asthma is triggered or many hours later. Symptoms include:  · Wheezing.  · Excessive nighttime or early morning coughing.  · Frequent or severe coughing with a common cold.  · Chest tightness.  · Shortness of breath.  DIAGNOSIS   The diagnosis of asthma is made by a review of your medical history and a physical exam. Tests may also be performed. These may include:  · Lung function studies. These tests show how much air you breathe in and out.  · Allergy tests.  · Imaging tests such as X-rays.  TREATMENT   Asthma cannot be cured, but it can usually be controlled. Treatment involves  identifying and avoiding your asthma triggers. It also involves medicines. There are 2 classes of medicine used for asthma treatment:   · Controller medicines. These prevent asthma symptoms from occurring. They are usually taken every day.  · Reliever or rescue medicines. These quickly relieve asthma symptoms. They are used as needed and provide short-term relief.  Your health care provider will help you create an asthma action plan. An asthma action plan is a written plan for managing and treating your asthma attacks. It includes a list of your asthma triggers and how they may be avoided. It also includes information on when medicines should be taken and when their dosage should be changed. An action plan may also involve the use of a device called a peak flow meter. A peak flow meter measures how well the lungs are working. It helps you monitor your condition.  HOME CARE INSTRUCTIONS   · Take medicines only as directed by your health care provider. Speak with your health care provider if you have questions about how or when to take the medicines.  · Use a peak flow meter as directed by your health care provider. Record and keep track of readings.  · Understand and use the action plan to help minimize or stop an asthma attack without needing to seek medical care.  · Control your home environment in the following ways to help prevent asthma attacks:  ¨ Do not smoke. Avoid being exposed to secondhand smoke.  ¨ Change your heating and air conditioning filter regularly.  ¨ Limit your use of fireplaces and wood stoves.  ¨ Get rid of pests (such as roaches and mice) and their droppings.  ¨ Throw away plants if you see mold on them.  ¨ Clean your floors and dust regularly. Use unscented cleaning products.  ¨ Try to have someone else vacuum for you regularly. Stay out of rooms while they are being vacuumed and for a short while afterward. If you vacuum, use a dust mask from a hardware store, a double-layered or microfilter  vacuum  bag, or a vacuum  with a HEPA filter.  ¨ Replace carpet with wood, tile, or vinyl delfin. Carpet can trap dander and dust.  ¨ Use allergy-proof pillows, mattress covers, and box spring covers.  ¨ Wash bed sheets and blankets every week in hot water and dry them in a dryer.  ¨ Use blankets that are made of polyester or cotton.  ¨ Clean bathrooms and balwinder with bleach. If possible, have someone repaint the walls in these rooms with mold-resistant paint. Keep out of the rooms that are being cleaned and painted.  ¨ Wash hands frequently.  SEEK MEDICAL CARE IF:   · You have wheezing, shortness of breath, or a cough even if taking medicine to prevent attacks.  · The colored mucus you cough up (sputum) is thicker than usual.  · Your sputum changes from clear or white to yellow, green, gray, or bloody.  · You have any problems that may be related to the medicines you are taking (such as a rash, itching, swelling, or trouble breathing).  · You are using a reliever medicine more than 2-3 times per week.  · Your peak flow is still at 50-79% of your personal best after following your action plan for 1 hour.  · You have a fever.  SEEK IMMEDIATE MEDICAL CARE IF:   · You seem to be getting worse and are unresponsive to treatment during an asthma attack.  · You are short of breath even at rest.  · You get short of breath when doing very little physical activity.  · You have difficulty eating, drinking, or talking due to asthma symptoms.  · You develop chest pain.  · You develop a fast heartbeat.  · You have a bluish color to your lips or fingernails.  · You are light-headed, dizzy, or faint.  · Your peak flow is less than 50% of your personal best.  MAKE SURE YOU:   · Understand these instructions.  · Will watch your condition.  · Will get help right away if you are not doing well or get worse.     This information is not intended to replace advice given to you by your health care provider. Make sure you  discuss any questions you have with your health care provider.     Document Released: 12/18/2006 Document Revised: 01/08/2016 Document Reviewed: 07/17/2014  [employer] Interactive Patient Education ©2016 [employer]            Tinybeans Access Code: [email or web address]  Expires: 4/13/2017  4:35 PM    MyCMirubeet  A secure, online tool to manage your health information     [employer] Tinybeans® is a secure, online tool that connects you to your personalized health information from the privacy of your home -- day or night - making it very easy for you to manage your healthcare. Once the activation process is completed, you can even access your medical information using the Tinybeans anny, which is available for free in the Apple Anny store or Google Play store.     Tinybeans provides the following levels of access (as shown below):   My Chart Features   [facility] Doctor [facility]  Urgent  Care Non-[facility]  Primary Care  Doctor   Email your healthcare team securely and privately 24/7 X X X    Manage appointments: schedule your next appointment; view details of past/upcoming appointments X      Request prescription refills. X      View recent personal medical records, including lab and immunizations X X X X   View health record, including health history, allergies, medications X X X X   Read reports about your outpatient visits, procedures, consult and ER notes X X X X   See your discharge summary, which is a recap of your hospital and/or ER visit that includes your diagnosis, lab results, and care plan. X X       How to register for Tinybeans:  1. Go to  [email or web address].  2. Click on the Sign Up Now box, which takes you to the New Member Sign Up page. You will need to provide the following information:  a. Enter your Tinybeans Access Code exactly as it appears at the top of this page. (You will not need to use this code after you’ve completed the sign-up process. If you do not sign up before the  expiration date, you must request a new code.)   b. Enter your date of birth.   c. Enter your home email address.   d. Click Submit, and follow the next screen’s instructions.  3. Create a [employer] ID. This will be your [employer] login ID and cannot be changed, so think of one that is secure and easy to remember.  4. Create a [employer] password. You can change your password at any time.  5. Enter your Password Reset Question and Answer. This can be used at a later time if you forget your password.   6. Enter your e-mail address. This allows you to receive e-mail notifications when new information is available in [employer].  7. Click Sign Up. You can now view your health information.    For assistance activating your [employer] account, call [phone]

## 2017-03-14 NOTE — PATIENT INSTRUCTIONS
"Go to the nearest hospital Emergency Department, or call 911, if any worsening condition.    Upper Respiratory Infection, Adult  Most upper respiratory infections (URIs) are caused by a virus. A URI affects the nose, throat, and upper air passages. The most common type of URI is often called \"the common cold.\"  HOME CARE   · Take medicines only as told by your doctor.  · Gargle warm saltwater or take cough drops to comfort your throat as told by your doctor.  · Use a warm mist humidifier or inhale steam from a shower to increase air moisture. This may make it easier to breathe.  · Drink enough fluid to keep your pee (urine) clear or pale yellow.  · Eat soups and other clear broths.  · Have a healthy diet.  · Rest as needed.  · Go back to work when your fever is gone or your doctor says it is okay.  ¨ You may need to stay home longer to avoid giving your URI to others.  ¨ You can also wear a face mask and wash your hands often to prevent spread of the virus.  · Use your inhaler more if you have asthma.  · Do not use any tobacco products, including cigarettes, chewing tobacco, or electronic cigarettes. If you need help quitting, ask your doctor.  GET HELP IF:  · You are getting worse, not better.  · Your symptoms are not helped by medicine.  · You have chills.  · You are getting more short of breath.  · You have brown or red mucus.  · You have yellow or brown discharge from your nose.  · You have pain in your face, especially when you bend forward.  · You have a fever.  · You have puffy (swollen) neck glands.  · You have pain while swallowing.  · You have white areas in the back of your throat.  GET HELP RIGHT AWAY IF:   · You have very bad or constant:  ¨ Headache.  ¨ Ear pain.  ¨ Pain in your forehead, behind your eyes, and over your cheekbones (sinus pain).  ¨ Chest pain.  · You have long-lasting (chronic) lung disease and any of the following:  ¨ Wheezing.  ¨ Long-lasting cough.  ¨ Coughing up blood.  ¨ A change " in your usual mucus.  · You have a stiff neck.  · You have changes in your:  ¨ Vision.  ¨ Hearing.  ¨ Thinking.  ¨ Mood.  MAKE SURE YOU:   · Understand these instructions.  · Will watch your condition.  · Will get help right away if you are not doing well or get worse.     This information is not intended to replace advice given to you by your health care provider. Make sure you discuss any questions you have with your health care provider.     Document Released: 06/05/2009 Document Revised: 05/03/2016 Document Reviewed: 03/25/2015  [employer] Interactive Patient Education ©2016 [employer]  Asthma, Adult  Asthma is a recurring condition in which the airways tighten and narrow. Asthma can make it difficult to breathe. It can cause coughing, wheezing, and shortness of breath. Asthma episodes, also called asthma attacks, range from minor to life-threatening. Asthma cannot be cured, but medicines and lifestyle changes can help control it.  CAUSES  Asthma is believed to be caused by inherited (genetic) and environmental factors, but its exact cause is unknown. Asthma may be triggered by allergens, lung infections, or irritants in the air. Asthma triggers are different for each person. Common triggers include:   · Animal dander.  · Dust mites.  · Cockroaches.  · Pollen from trees or grass.  · Mold.  · Smoke.  · Air pollutants such as dust, household , hair sprays, aerosol sprays, paint fumes, strong chemicals, or strong odors.  · Cold air, weather changes, and winds (which increase molds and pollens in the air).  · Strong emotional expressions such as crying or laughing hard.  · Stress.  · Certain medicines (such as aspirin) or types of drugs (such as beta-blockers).  · Sulfites in foods and drinks. Foods and drinks that may contain sulfites include dried fruit, potato chips, and sparkling grape juice.  · Infections or inflammatory conditions such as the flu, a cold, or an inflammation of the nasal membranes  (rhinitis).  · Gastroesophageal reflux disease (GERD).  · Exercise or strenuous activity.  SYMPTOMS  Symptoms may occur immediately after asthma is triggered or many hours later. Symptoms include:  · Wheezing.  · Excessive nighttime or early morning coughing.  · Frequent or severe coughing with a common cold.  · Chest tightness.  · Shortness of breath.  DIAGNOSIS   The diagnosis of asthma is made by a review of your medical history and a physical exam. Tests may also be performed. These may include:  · Lung function studies. These tests show how much air you breathe in and out.  · Allergy tests.  · Imaging tests such as X-rays.  TREATMENT   Asthma cannot be cured, but it can usually be controlled. Treatment involves identifying and avoiding your asthma triggers. It also involves medicines. There are 2 classes of medicine used for asthma treatment:   · Controller medicines. These prevent asthma symptoms from occurring. They are usually taken every day.  · Reliever or rescue medicines. These quickly relieve asthma symptoms. They are used as needed and provide short-term relief.  Your health care provider will help you create an asthma action plan. An asthma action plan is a written plan for managing and treating your asthma attacks. It includes a list of your asthma triggers and how they may be avoided. It also includes information on when medicines should be taken and when their dosage should be changed. An action plan may also involve the use of a device called a peak flow meter. A peak flow meter measures how well the lungs are working. It helps you monitor your condition.  HOME CARE INSTRUCTIONS   · Take medicines only as directed by your health care provider. Speak with your health care provider if you have questions about how or when to take the medicines.  · Use a peak flow meter as directed by your health care provider. Record and keep track of readings.  · Understand and use the action plan to help minimize  or stop an asthma attack without needing to seek medical care.  · Control your home environment in the following ways to help prevent asthma attacks:  ¨ Do not smoke. Avoid being exposed to secondhand smoke.  ¨ Change your heating and air conditioning filter regularly.  ¨ Limit your use of fireplaces and wood stoves.  ¨ Get rid of pests (such as roaches and mice) and their droppings.  ¨ Throw away plants if you see mold on them.  ¨ Clean your floors and dust regularly. Use unscented cleaning products.  ¨ Try to have someone else vacuum for you regularly. Stay out of rooms while they are being vacuumed and for a short while afterward. If you vacuum, use a dust mask from a hardware store, a double-layered or microfilter vacuum  bag, or a vacuum  with a HEPA filter.  ¨ Replace carpet with wood, tile, or vinyl delfin. Carpet can trap dander and dust.  ¨ Use allergy-proof pillows, mattress covers, and box spring covers.  ¨ Wash bed sheets and blankets every week in hot water and dry them in a dryer.  ¨ Use blankets that are made of polyester or cotton.  ¨ Clean bathrooms and balwinder with bleach. If possible, have someone repaint the walls in these rooms with mold-resistant paint. Keep out of the rooms that are being cleaned and painted.  ¨ Wash hands frequently.  SEEK MEDICAL CARE IF:   · You have wheezing, shortness of breath, or a cough even if taking medicine to prevent attacks.  · The colored mucus you cough up (sputum) is thicker than usual.  · Your sputum changes from clear or white to yellow, green, gray, or bloody.  · You have any problems that may be related to the medicines you are taking (such as a rash, itching, swelling, or trouble breathing).  · You are using a reliever medicine more than 2-3 times per week.  · Your peak flow is still at 50-79% of your personal best after following your action plan for 1 hour.  · You have a fever.  SEEK IMMEDIATE MEDICAL CARE IF:   · You seem to be getting  worse and are unresponsive to treatment during an asthma attack.  · You are short of breath even at rest.  · You get short of breath when doing very little physical activity.  · You have difficulty eating, drinking, or talking due to asthma symptoms.  · You develop chest pain.  · You develop a fast heartbeat.  · You have a bluish color to your lips or fingernails.  · You are light-headed, dizzy, or faint.  · Your peak flow is less than 50% of your personal best.  MAKE SURE YOU:   · Understand these instructions.  · Will watch your condition.  · Will get help right away if you are not doing well or get worse.     This information is not intended to replace advice given to you by your health care provider. Make sure you discuss any questions you have with your health care provider.     Document Released: 12/18/2006 Document Revised: 01/08/2016 Document Reviewed: 07/17/2014  [employer] Interactive Patient Education ©2016 [employer]

## 2018-10-03 ENCOUNTER — OFFICE VISIT (OUTPATIENT)
Dept: URGENT CARE | Facility: PHYSICIAN GROUP | Age: 48
End: 2018-10-03
Payer: COMMERCIAL

## 2018-10-03 VITALS
SYSTOLIC BLOOD PRESSURE: 128 MMHG | RESPIRATION RATE: 18 BRPM | OXYGEN SATURATION: 95 % | WEIGHT: 250 LBS | BODY MASS INDEX: 34.87 KG/M2 | HEART RATE: 77 BPM | DIASTOLIC BLOOD PRESSURE: 88 MMHG | TEMPERATURE: 98.1 F

## 2018-10-03 DIAGNOSIS — J01.41 ACUTE RECURRENT PANSINUSITIS: ICD-10-CM

## 2018-10-03 PROCEDURE — 99214 OFFICE O/P EST MOD 30 MIN: CPT | Performed by: FAMILY MEDICINE

## 2018-10-03 RX ORDER — DOXYCYCLINE HYCLATE 100 MG
100 TABLET ORAL 2 TIMES DAILY
Qty: 20 TAB | Refills: 0 | Status: SHIPPED | OUTPATIENT
Start: 2018-10-03 | End: 2018-10-13

## 2018-10-03 ASSESSMENT — ENCOUNTER SYMPTOMS
EYE DISCHARGE: 0
MYALGIAS: 0
WEIGHT LOSS: 0
EYE REDNESS: 0

## 2018-10-03 NOTE — PROGRESS NOTES
Subjective:      Tima Alexandra is a 48 y.o. male who presents with Sinus Problem (x7 days, HA, Congestion, Pressure in sinus)            1 wk frontal and maxillary sinus pressure and drainage. Progressively worse. Subjective fever/chills. +PMH sinusitis. No sinus surgery. No ST. No new cough. OTC nasal CS and sudafed with min relief. No other aggravating or alleviating factors.          Review of Systems   Constitutional: Positive for malaise/fatigue. Negative for weight loss.   HENT: Positive for ear pain (mild). Negative for ear discharge.    Eyes: Negative for discharge and redness.   Musculoskeletal: Negative for joint pain and myalgias.     .  Medications, Allergies, and current problem list reviewed today in Epic       Objective:     /88 (BP Location: Left arm, Patient Position: Sitting, BP Cuff Size: Adult)   Pulse 77   Temp 36.7 °C (98.1 °F) (Temporal)   Resp 18   Wt 113.4 kg (250 lb)   SpO2 95%   BMI 34.87 kg/m²      Physical Exam   Constitutional: He is oriented to person, place, and time. He appears well-developed and well-nourished. No distress.   HENT:   Head: Normocephalic and atraumatic.   Right Ear: External ear normal.   Left Ear: External ear normal.   Tender frontal and maxillary sinus bilateral, +PND   Eyes: Conjunctivae are normal.   Neck: Neck supple.   Cardiovascular: Normal rate, regular rhythm and normal heart sounds.    Pulmonary/Chest: Effort normal and breath sounds normal. He has no wheezes.   Lymphadenopathy:     He has no cervical adenopathy.   Neurological: He is alert and oriented to person, place, and time.   Skin: Skin is warm and dry.               Assessment/Plan:     1. Acute recurrent pansinusitis  doxycycline (VIBRAMYCIN) 100 MG Tab     Differential diagnosis, natural history, supportive care, and indications for immediate follow-up discussed at length.     Nasal saline, decongestant, nasal CS    Contingent antibiotic prescription given to patient to fill  upon meeting criteria of guidelines discussed.

## 2020-02-04 ENCOUNTER — APPOINTMENT (RX ONLY)
Dept: URBAN - METROPOLITAN AREA CLINIC 22 | Facility: CLINIC | Age: 50
Setting detail: DERMATOLOGY
End: 2020-02-04

## 2020-02-04 DIAGNOSIS — L21.8 OTHER SEBORRHEIC DERMATITIS: ICD-10-CM

## 2020-02-04 DIAGNOSIS — Z71.89 OTHER SPECIFIED COUNSELING: ICD-10-CM

## 2020-02-04 DIAGNOSIS — D22 MELANOCYTIC NEVI: ICD-10-CM

## 2020-02-04 DIAGNOSIS — L82.1 OTHER SEBORRHEIC KERATOSIS: ICD-10-CM

## 2020-02-04 DIAGNOSIS — L81.4 OTHER MELANIN HYPERPIGMENTATION: ICD-10-CM

## 2020-02-04 DIAGNOSIS — L73.8 OTHER SPECIFIED FOLLICULAR DISORDERS: ICD-10-CM

## 2020-02-04 PROBLEM — D22.5 MELANOCYTIC NEVI OF TRUNK: Status: ACTIVE | Noted: 2020-02-04

## 2020-02-04 PROCEDURE — 99203 OFFICE O/P NEW LOW 30 MIN: CPT

## 2020-02-04 PROCEDURE — ? COUNSELING

## 2020-02-04 ASSESSMENT — LOCATION DETAILED DESCRIPTION DERM
LOCATION DETAILED: RIGHT VENTRAL PROXIMAL FOREARM
LOCATION DETAILED: LEFT DISTAL POSTERIOR UPPER ARM
LOCATION DETAILED: LEFT ANTIHELIX
LOCATION DETAILED: INFERIOR THORACIC SPINE
LOCATION DETAILED: LOWER STERNUM
LOCATION DETAILED: LEFT VENTRAL PROXIMAL FOREARM
LOCATION DETAILED: SUPERIOR MID FOREHEAD
LOCATION DETAILED: SUPERIOR THORACIC SPINE
LOCATION DETAILED: RIGHT DISTAL POSTERIOR UPPER ARM
LOCATION DETAILED: INFERIOR MID FOREHEAD
LOCATION DETAILED: RIGHT ANTIHELIX

## 2020-02-04 ASSESSMENT — LOCATION SIMPLE DESCRIPTION DERM
LOCATION SIMPLE: LEFT UPPER ARM
LOCATION SIMPLE: SUPERIOR FOREHEAD
LOCATION SIMPLE: RIGHT FOREARM
LOCATION SIMPLE: RIGHT UPPER ARM
LOCATION SIMPLE: LEFT FOREARM
LOCATION SIMPLE: CHEST
LOCATION SIMPLE: INFERIOR FOREHEAD
LOCATION SIMPLE: UPPER BACK
LOCATION SIMPLE: RIGHT EAR
LOCATION SIMPLE: LEFT EAR

## 2020-02-04 ASSESSMENT — LOCATION ZONE DERM
LOCATION ZONE: ARM
LOCATION ZONE: FACE
LOCATION ZONE: EAR
LOCATION ZONE: TRUNK

## 2020-06-11 ENCOUNTER — OFFICE VISIT (OUTPATIENT)
Dept: URGENT CARE | Facility: PHYSICIAN GROUP | Age: 50
End: 2020-06-11
Payer: COMMERCIAL

## 2020-06-11 VITALS
TEMPERATURE: 98.8 F | OXYGEN SATURATION: 96 % | DIASTOLIC BLOOD PRESSURE: 100 MMHG | HEART RATE: 90 BPM | RESPIRATION RATE: 16 BRPM | SYSTOLIC BLOOD PRESSURE: 152 MMHG

## 2020-06-11 DIAGNOSIS — H92.02 LEFT EAR PAIN: ICD-10-CM

## 2020-06-11 DIAGNOSIS — J30.2 SEASONAL ALLERGIC RHINITIS, UNSPECIFIED TRIGGER: ICD-10-CM

## 2020-06-11 PROCEDURE — 99214 OFFICE O/P EST MOD 30 MIN: CPT | Performed by: PHYSICIAN ASSISTANT

## 2020-06-11 RX ORDER — FLUTICASONE PROPIONATE 50 MCG
1 SPRAY, SUSPENSION (ML) NASAL DAILY
Qty: 16 G | Refills: 1 | Status: SHIPPED | OUTPATIENT
Start: 2020-06-11

## 2020-06-11 NOTE — PROGRESS NOTES
Subjective:      Tima Alexandra is a 49 y.o. male who presents with Ear Fullness (left ear feel muffeled,x 4 days)            HPI  49-year-old male presents to urgent care with new problem of left ear fullness and mild pain worsening since onset 4 days ago.  Patient reports muffled hearing in left ear.  Associated mild congestion, runny nose, sinus pressure, and tinnitus.  Patient reports history of similar with environmental/seasonal allergies.  She denies fevers, chills, body aches, throat, or cough.  He has been taking OTC decongestants with minimal relief. Denies other associated aggravating or alleviating factors.     Review of Systems   Constitutional: Negative for chills, fever and malaise/fatigue.   HENT: Positive for congestion, ear pain, sinus pain and tinnitus. Negative for ear discharge and sore throat.    Eyes: Negative for pain, discharge and redness.   Respiratory: Negative for cough, sputum production, shortness of breath and wheezing.    Cardiovascular: Negative for chest pain.   Gastrointestinal: Negative for nausea and vomiting.   Musculoskeletal: Negative for myalgias and neck pain.   Skin: Negative for itching and rash.   Neurological: Negative for dizziness and headaches.   Endo/Heme/Allergies: Positive for environmental allergies.   All other systems reviewed and are negative.      Past Medical History:   Diagnosis Date   • ASTHMA     usually with colds   • Bronchitis    • Chest discomfort 6/24/2013   • Dyslipidemia 6/24/2013   • Elevated lipids 7/21/2011   • Family history of premature CAD 6/24/2013    Father had MI at 45 yo   • HTN (hypertension) 6/24/2013   • Hypertension    • Personal history of prior ablation treatment    • PSVT (paroxysmal supraventricular tachycardia) ([facility])    • Sleep apnea     cpap in use   • Snoring    • SVT (supraventricular tachycardia) (Former[facility]) 7/21/2011     Medications and allergies reviewed in [facility].  Social History     Tobacco Use   • Smoking status: Former  Smoker     Packs/day: 0.25     Years: 3.00     Pack years: 0.75     Types: Cigarettes     Last attempt to quit: 2013     Years since quittin.1   • Smokeless tobacco: Current User     Types: Chew     Last attempt to quit: 2013   Substance Use Topics   • Alcohol use: Yes     Alcohol/week: 0.0 oz     Comment: 2x/wk      Objective:     /100 (BP Location: Right arm, Patient Position: Sitting, BP Cuff Size: Adult)   Pulse 90   Temp 37.1 °C (98.8 °F) (Temporal)   Resp 16   SpO2 96%      Physical Exam  Vitals signs reviewed.   Constitutional:       Appearance: Normal appearance. He is well-developed. He is not ill-appearing.   HENT:      Head: Normocephalic and atraumatic.      Right Ear: Tympanic membrane, ear canal and external ear normal.      Left Ear: External ear normal. No drainage, swelling or tenderness. A middle ear effusion is present. No mastoid tenderness.      Nose: Congestion present.      Mouth/Throat:      Mouth: Mucous membranes are moist.      Pharynx: Oropharynx is clear.   Eyes:      Conjunctiva/sclera: Conjunctivae normal.   Neck:      Musculoskeletal: Normal range of motion and neck supple.   Cardiovascular:      Rate and Rhythm: Normal rate and regular rhythm.      Heart sounds: Normal heart sounds.   Pulmonary:      Effort: Pulmonary effort is normal. No respiratory distress.      Breath sounds: Normal breath sounds.   Skin:     General: Skin is warm and dry.      Findings: No rash.   Neurological:      General: No focal deficit present.      Mental Status: He is alert and oriented to person, place, and time.   Psychiatric:         Mood and Affect: Mood normal.         Behavior: Behavior normal.         Thought Content: Thought content normal.         Judgment: Judgment normal.                 Assessment/Plan:     1. Seasonal allergic rhinitis, unspecified trigger  fluticasone (FLONASE) 50 MCG/ACT nasal spray    REFERRAL TO ENT   2. Left ear pain  REFERRAL TO ENT     PT was  instructed to begin a daily OTC allergy medication for relief of allergy symptoms.  Ex: allegra, claritin, zyrtec, allerclear, etc.   Pt can also take OTC benadryl at bedtime if symptoms are keeping them awake at night.     Patient given referral to ENT if symptoms persist.    Patient has been taking over-the-counter decongestants potential to cause elevated blood pressure.  Your blood pressure is elevated here in Urgent Care. Please monitor your blood pressure over the next several days. If your blood pressure continues to be 120/80 or higher please contact your physician for blood pressure management.

## 2020-06-11 NOTE — PROGRESS NOTES
Subjective:      Tima Alexandra is a 49 y.o. male who presents with Ear Fullness (left ear feel muffeled,x 4 days)            HPI  1-year-old male presents to urgent care with new problem of left ear fullness and pain worsening since onset 4 days ago.  ROS    Past Medical History:   Diagnosis Date   • ASTHMA     usually with colds   • Bronchitis    • Chest discomfort 2013   • Dyslipidemia 2013   • Elevated lipids 2011   • Family history of premature CAD 2013    Father had MI at 45 yo   • HTN (hypertension) 2013   • Hypertension    • Personal history of prior ablation treatment    • PSVT (paroxysmal supraventricular tachycardia) ([facility])    • Sleep apnea     cpap in use   • Snoring    • SVT (supraventricular tachycardia) (H[facility]) 2011     Medications and allergies reviewed in epic.  Social History     Tobacco Use   • Smoking status: Former Smoker     Packs/day: 0.25     Years: 3.00     Pack years: 0.75     Types: Cigarettes     Last attempt to quit: 2013     Years since quittin.1   • Smokeless tobacco: Current User     Types: Chew     Last attempt to quit: 2013   Substance Use Topics   • Alcohol use: Yes     Alcohol/week: 0.0 oz     Comment: 2x/wk      Objective:     /100 (BP Location: Right arm, Patient Position: Sitting, BP Cuff Size: Adult)   Pulse 90   Temp 37.1 °C (98.8 °F) (Temporal)   Resp 16   SpO2 96%      Physical Exam            Assessment/Plan:     1. Seasonal allergic rhinitis, unspecified trigger  fluticasone (FLONASE) 50 MCG/ACT nasal spray    REFERRAL TO ENT   2. Left ear pain  REFERRAL TO ENT

## 2020-06-12 ASSESSMENT — ENCOUNTER SYMPTOMS
SPUTUM PRODUCTION: 0
SINUS PAIN: 1
NECK PAIN: 0
VOMITING: 0
SORE THROAT: 0
FEVER: 0
HEADACHES: 0
NAUSEA: 0
SHORTNESS OF BREATH: 0
DIZZINESS: 0
MYALGIAS: 0
WHEEZING: 0
EYE DISCHARGE: 0
EYE PAIN: 0
EYE REDNESS: 0
CHILLS: 0
COUGH: 0

## 2022-12-01 ENCOUNTER — APPOINTMENT (RX ONLY)
Dept: URBAN - METROPOLITAN AREA CLINIC 22 | Facility: CLINIC | Age: 52
Setting detail: DERMATOLOGY
End: 2022-12-01

## 2022-12-01 DIAGNOSIS — D22 MELANOCYTIC NEVI: ICD-10-CM

## 2022-12-01 DIAGNOSIS — L82.0 INFLAMED SEBORRHEIC KERATOSIS: ICD-10-CM

## 2022-12-01 DIAGNOSIS — L57.0 ACTINIC KERATOSIS: ICD-10-CM

## 2022-12-01 DIAGNOSIS — Z71.89 OTHER SPECIFIED COUNSELING: ICD-10-CM

## 2022-12-01 DIAGNOSIS — D18.0 HEMANGIOMA: ICD-10-CM

## 2022-12-01 DIAGNOSIS — L81.4 OTHER MELANIN HYPERPIGMENTATION: ICD-10-CM

## 2022-12-01 DIAGNOSIS — L82.1 OTHER SEBORRHEIC KERATOSIS: ICD-10-CM

## 2022-12-01 PROBLEM — D22.5 MELANOCYTIC NEVI OF TRUNK: Status: ACTIVE | Noted: 2022-12-01

## 2022-12-01 PROBLEM — D18.01 HEMANGIOMA OF SKIN AND SUBCUTANEOUS TISSUE: Status: ACTIVE | Noted: 2022-12-01

## 2022-12-01 PROCEDURE — ? SUNSCREEN RECOMMENDATIONS

## 2022-12-01 PROCEDURE — 17110 DESTRUCTION B9 LES UP TO 14: CPT

## 2022-12-01 PROCEDURE — 99213 OFFICE O/P EST LOW 20 MIN: CPT | Mod: 25

## 2022-12-01 PROCEDURE — ? LIQUID NITROGEN

## 2022-12-01 PROCEDURE — 17003 DESTRUCT PREMALG LES 2-14: CPT | Mod: 59

## 2022-12-01 PROCEDURE — ? COUNSELING

## 2022-12-01 PROCEDURE — 17000 DESTRUCT PREMALG LESION: CPT | Mod: 59

## 2022-12-01 ASSESSMENT — LOCATION DETAILED DESCRIPTION DERM
LOCATION DETAILED: LEFT PROXIMAL DORSAL FOREARM
LOCATION DETAILED: EPIGASTRIC SKIN
LOCATION DETAILED: RIGHT DISTAL DORSAL FOREARM
LOCATION DETAILED: RIGHT SUPERIOR MEDIAL MIDBACK
LOCATION DETAILED: RIGHT ANTERIOR PROXIMAL THIGH
LOCATION DETAILED: LEFT SUPERIOR MEDIAL UPPER BACK

## 2022-12-01 ASSESSMENT — LOCATION ZONE DERM
LOCATION ZONE: TRUNK
LOCATION ZONE: LEG
LOCATION ZONE: ARM

## 2022-12-01 ASSESSMENT — LOCATION SIMPLE DESCRIPTION DERM
LOCATION SIMPLE: RIGHT THIGH
LOCATION SIMPLE: LEFT UPPER BACK
LOCATION SIMPLE: LEFT FOREARM
LOCATION SIMPLE: ABDOMEN
LOCATION SIMPLE: RIGHT FOREARM
LOCATION SIMPLE: RIGHT LOWER BACK

## 2022-12-01 NOTE — PROCEDURE: LIQUID NITROGEN
Render Note In Bullet Format When Appropriate: No
Number Of Freeze-Thaw Cycles: 3 freeze-thaw cycles
Render Post-Care Instructions In Note?: yes
Application Tool (Optional): Liquid Nitrogen Sprayer
Medical Necessity Clause: This procedure was medically necessary because the lesions that were treated were:
Spray Paint Text: The liquid nitrogen was applied to the skin utilizing a spray paint frosting technique.
Duration Of Freeze Thaw-Cycle (Seconds): 3
Post-Care Instructions: I reviewed with the patient in detail post-care instructions. Patient is to wear sunprotection, and avoid picking at any of the treated lesions. Pt may apply Vaseline to crusted or scabbing areas.
Detail Level: Detailed
Consent: The patient's consent was obtained including but not limited to risks of crusting, scabbing, blistering, scarring, darker or lighter pigmentary change, recurrence, incomplete removal and infection.
Medical Necessity Information: It is in your best interest to select a reason for this procedure from the list below. All of these items fulfill various CMS LCD requirements except the new and changing color options.
Number Of Freeze-Thaw Cycles: 2 freeze-thaw cycles

## 2023-11-15 ENCOUNTER — OFFICE VISIT (OUTPATIENT)
Dept: URGENT CARE | Facility: PHYSICIAN GROUP | Age: 53
End: 2023-11-15
Payer: COMMERCIAL

## 2023-11-15 ENCOUNTER — HOSPITAL ENCOUNTER (OUTPATIENT)
Dept: RADIOLOGY | Facility: MEDICAL CENTER | Age: 53
End: 2023-11-15
Payer: COMMERCIAL

## 2023-11-15 VITALS
DIASTOLIC BLOOD PRESSURE: 80 MMHG | TEMPERATURE: 98.3 F | BODY MASS INDEX: 35 KG/M2 | SYSTOLIC BLOOD PRESSURE: 126 MMHG | HEART RATE: 94 BPM | HEIGHT: 71 IN | RESPIRATION RATE: 16 BRPM | OXYGEN SATURATION: 97 % | WEIGHT: 250 LBS

## 2023-11-15 DIAGNOSIS — R06.09 OTHER FORM OF DYSPNEA: ICD-10-CM

## 2023-11-15 DIAGNOSIS — R05.1 ACUTE COUGH: ICD-10-CM

## 2023-11-15 PROCEDURE — 99213 OFFICE O/P EST LOW 20 MIN: CPT | Mod: 25

## 2023-11-15 PROCEDURE — 3074F SYST BP LT 130 MM HG: CPT

## 2023-11-15 PROCEDURE — 94640 AIRWAY INHALATION TREATMENT: CPT

## 2023-11-15 PROCEDURE — 71046 X-RAY EXAM CHEST 2 VIEWS: CPT

## 2023-11-15 PROCEDURE — 3079F DIAST BP 80-89 MM HG: CPT

## 2023-11-15 RX ORDER — IPRATROPIUM BROMIDE AND ALBUTEROL SULFATE 2.5; .5 MG/3ML; MG/3ML
3 SOLUTION RESPIRATORY (INHALATION) ONCE
Status: COMPLETED | OUTPATIENT
Start: 2023-11-15 | End: 2023-11-15

## 2023-11-15 RX ORDER — BENZONATATE 100 MG/1
100 CAPSULE ORAL 3 TIMES DAILY PRN
Qty: 60 CAPSULE | Refills: 0 | Status: SHIPPED | OUTPATIENT
Start: 2023-11-15

## 2023-11-15 RX ORDER — TESTOSTERONE CYPIONATE 200 MG/ML
INJECTION, SOLUTION INTRAMUSCULAR
COMMUNITY
Start: 2023-10-26

## 2023-11-15 RX ADMIN — IPRATROPIUM BROMIDE AND ALBUTEROL SULFATE 3 ML: 2.5; .5 SOLUTION RESPIRATORY (INHALATION) at 15:43

## 2023-11-15 ASSESSMENT — ENCOUNTER SYMPTOMS
FEVER: 0
DIARRHEA: 0
SPUTUM PRODUCTION: 1
ABDOMINAL PAIN: 0
NAUSEA: 0
SHORTNESS OF BREATH: 1
CHILLS: 0
COUGH: 1
VOMITING: 0

## 2023-11-15 NOTE — PROGRESS NOTES
"Subjective:   Tima Alexandra is a 53 y.o. male who presents for Other (States he started having cold symptoms on Saturday and states that he was coughing up a lot of stuff up and states he feels it in his lungs and concern of pneumonia. Pt states he teste\d positive for COVID on Saturday and Today. )  Patient presents to urgent care with complaints of cough and congestion x5 days.  He reports fevers at onset of symptoms, which are resolving.  He reports OTC Tylenol for alleviation of symptoms.  Denies any sinus pressure.  Patient report dyspnea with activity early this morning prompting his visit and concern for pneumonia.     Review of Systems   Constitutional:  Negative for chills and fever.   HENT:  Positive for congestion.    Respiratory:  Positive for cough, sputum production and shortness of breath.    Cardiovascular:  Negative for chest pain.   Gastrointestinal:  Negative for abdominal pain, diarrhea, nausea and vomiting.       Medications, Allergies, and current problem list reviewed today in Epic.     Objective:     /80 (BP Location: Left arm, Patient Position: Sitting, BP Cuff Size: Large adult)   Pulse 94   Temp 36.8 °C (98.3 °F) (Temporal)   Resp 16   Ht 1.803 m (5' 11\")   Wt 113 kg (250 lb)   SpO2 97%     Physical Exam  Vitals reviewed.   Constitutional:       Appearance: Normal appearance.      Interventions: He is not intubated.  HENT:      Right Ear: Tympanic membrane normal.      Left Ear: Tympanic membrane normal.      Nose: No congestion.      Mouth/Throat:      Mouth: Mucous membranes are moist.      Pharynx: Oropharynx is clear. No oropharyngeal exudate or posterior oropharyngeal erythema.   Cardiovascular:      Rate and Rhythm: Normal rate and regular rhythm.      Pulses: Normal pulses.      Heart sounds: Normal heart sounds.   Pulmonary:      Effort: No respiratory distress or retractions. He is not intubated.      Breath sounds: No stridor, decreased air movement or " transmitted upper airway sounds. No decreased breath sounds, wheezing, rhonchi or rales.   Skin:     Capillary Refill: Capillary refill takes less than 2 seconds.   Neurological:      General: No focal deficit present.      Mental Status: He is alert.   Psychiatric:         Mood and Affect: Mood normal.         Behavior: Behavior normal.         Assessment/Plan:     Diagnosis and associated orders:     1. Other form of dyspnea  ipratropium-albuterol (DUONEB) nebulizer solution    DX-CHEST-2 VIEWS    benzonatate (TESSALON) 100 MG Cap         Comments/MDM:     Patient presents to urgent care with complaints of cough and congestion x5 days.  He reports fevers at onset of symptoms which are resolving.  He was concerned earlier this morning about shortness of breath with exertion prompting his visit here today.  He is clear to auscultation bilaterally.  No crackles, rhonchi or wheezes appreciated.  TMs are intact bilaterally.  Mild pharyngeal erythema appreciated.  Vital signs are stable in clinic, he is afebrile.  SPO2 is 97%.  Chest x-ray performed in clinic.  No concerns for cardiopulmonary abnormalities.  Discussed likely viral etiology of symptoms.  Instructed on appropriate OTC management.  Instructed on adequate hydration and rest.    Instructed to return to ER or urgent care if symptoms worsen or fail to improve.           Differential diagnosis, natural history, supportive care, and indications for immediate follow-up discussed.    Advised the patient to follow-up with the primary care physician for recheck, reevaluation, and consideration of further management.    Please note that this dictation was created using voice recognition software. I have made a reasonable attempt to correct obvious errors, but I expect that there are errors of grammar and possibly content that I did not discover before finalizing the note.    This note was electronically signed by ALEXANDER Jones

## 2023-11-18 RX ORDER — PREDNISONE 20 MG/1
20 TABLET ORAL 2 TIMES DAILY
Qty: 10 TABLET | Refills: 0 | Status: SHIPPED | OUTPATIENT
Start: 2023-11-18 | End: 2023-11-23

## 2023-11-18 NOTE — PROGRESS NOTES
Patient called [place] urgent care stating that his cough has not been getting better.  Called patient and discussed symptoms.  Stated that in the past he has required prednisone in order to alleviate symptoms of cough.  He denies any shortness of breath or chest pain at this time.  Prescription for prednisone 40 mg a day x5 days sent to preferred pharmacy.  Instructed patient to return to urgent care or ER if symptoms worsen or fail to improve.

## 2024-01-01 ENCOUNTER — OFFICE VISIT (OUTPATIENT)
Dept: URGENT CARE | Facility: PHYSICIAN GROUP | Age: 54
End: 2024-01-01
Payer: COMMERCIAL

## 2024-01-01 VITALS
WEIGHT: 260.14 LBS | HEART RATE: 108 BPM | OXYGEN SATURATION: 94 % | BODY MASS INDEX: 36.42 KG/M2 | TEMPERATURE: 99.4 F | DIASTOLIC BLOOD PRESSURE: 92 MMHG | RESPIRATION RATE: 16 BRPM | SYSTOLIC BLOOD PRESSURE: 130 MMHG | HEIGHT: 71 IN

## 2024-01-01 DIAGNOSIS — J32.9 RHINOSINUSITIS: ICD-10-CM

## 2024-01-01 PROCEDURE — 99213 OFFICE O/P EST LOW 20 MIN: CPT

## 2024-01-01 PROCEDURE — 3075F SYST BP GE 130 - 139MM HG: CPT

## 2024-01-01 PROCEDURE — 3080F DIAST BP >= 90 MM HG: CPT

## 2024-01-01 RX ORDER — DOXYCYCLINE HYCLATE 100 MG
100 TABLET ORAL 2 TIMES DAILY
Qty: 14 TABLET | Refills: 0 | Status: SHIPPED | OUTPATIENT
Start: 2024-01-08 | End: 2024-01-15

## 2024-01-02 ASSESSMENT — ENCOUNTER SYMPTOMS
FEVER: 0
CHILLS: 0
WEIGHT LOSS: 0
COUGH: 0
SINUS PAIN: 1
DIAPHORESIS: 0

## 2024-01-02 NOTE — PROGRESS NOTES
Subjective:   Tima Alexandra is a 53 y.o. male who presents for Nasal Congestion (With right ear and throat pain. Thinks he has a sinus infection. )      HPI: This is a 53-year-old who presents to for head congestion, ear pain, and sinus congestion.  Patient reports symptoms developed over the last few days.  He has not taken anything for his symptoms.  He denies fevers, chills, body aches.  He reports a long history of sinus infections with same presentation.      Review of Systems   Constitutional:  Negative for chills, diaphoresis, fever, malaise/fatigue and weight loss.   HENT:  Positive for congestion, ear pain and sinus pain.    Respiratory:  Negative for cough.        Medications:    Current Outpatient Medications on File Prior to Visit   Medication Sig Dispense Refill    DILTIAZem CD (CARDIZEM CD) 240 MG CAPSULE SR 24 HR Take 240 mg by mouth.      Albuterol Sulfate 108 (90 BASE) MCG/ACT AEROSOL POWDER, BREATH ACTIVATED Inhale 1-2 Puffs by mouth every 6 hours as needed (coughing, wheezing). 1 Each 0    testosterone cypionate (DEPO-TESTOSTERONE) 200 MG/ML injection ADMINISTER 1 ML IN THE MUSCLE EVERY 2 WEEKS      benzonatate (TESSALON) 100 MG Cap Take 1 Capsule by mouth 3 times a day as needed for Cough. (Patient not taking: Reported on 1/1/2024) 60 Capsule 0    fluticasone (FLONASE) 50 MCG/ACT nasal spray Spray 1 Spray in nose every day. (Patient not taking: Reported on 11/15/2023) 16 g 1    guaifenesin-codeine (ROBITUSSIN AC) Solution oral solution Take 10 mL by mouth at bedtime as needed for Cough. (Patient not taking: Reported on 11/15/2023) 70 mL 0    ADVAIR DISKUS 250-50 MCG/DOSE AEROSOL POWDER, BREATH ACTIVATED Inhale 1 Puff by mouth 2 times a day. (Patient not taking: Reported on 11/15/2023) 1 Inhaler 3    benzonatate (TESSALON) 100 MG Cap Take 1 Cap by mouth 3 times a day as needed. (Patient not taking: Reported on 6/11/2020) 20 Cap 0    doxycycline (VIBRAMYCIN) 100 MG Tab Take 1 Tab by mouth 2  times a day. (Patient not taking: Reported on 6/11/2020) 14 Tab 0    BENICAR 20 MG Tab  (Patient not taking: Reported on 11/15/2023)  9    alprazolam (XANAX) 0.5 MG Tab  (Patient not taking: Reported on 11/15/2023)  0    doxycycline (VIBRAMYCIN) 100 MG Tab Take 1 Tab by mouth 2 times a day. (Patient not taking: Reported on 6/11/2020) 20 Tab 0    guaifenesin LA (MUCINEX) 600 MG TABLET SR 12 HR Take 600 mg by mouth every 12 hours.      alprazolam (XANAX) 0.25 MG TABS Take 1 Tab by mouth as needed for Anxiety. (Patient not taking: Reported on 11/15/2023) 10 Tab 0    atorvastatin (LIPITOR) 10 MG TABS Take 10 mg by mouth every evening. (Patient not taking: Reported on 11/15/2023)      MELATONIN by Does not apply route every 48 hours as needed. (Patient not taking: Reported on 11/15/2023)      Omega-3 Fatty Acids (FISH OIL PO) Take 2,000 mg by mouth every day.      Zolpidem Tartrate (AMBIEN PO) Take  by mouth. (Patient not taking: Reported on 11/15/2023)       No current facility-administered medications on file prior to visit.        Allergies:   Pcn [penicillins]    Problem List:   Patient Active Problem List   Diagnosis    SVT (supraventricular tachycardia)    Elevated lipids    Sleep apnea    Personal history of prior ablation treatment    Chest discomfort    Dyslipidemia    Family history of premature CAD    HTN (hypertension)        Surgical History:  Past Surgical History:   Procedure Laterality Date    RECOVERY  8/12/2011    Performed by SURGERY, CATH-RECOVERY at [facility]    OTHER  2009    vasectomy       Past Social Hx:   Social History     Tobacco Use    Smoking status: Former     Current packs/day: 0.00     Average packs/day: 0.3 packs/day for 3.0 years (0.8 ttl pk-yrs)     Types: Cigarettes     Start date: 4/24/2010     Quit date: 4/24/2013     Years since quitting: 10.6    Smokeless tobacco: Current     Types: Chew     Last attempt to quit: 4/24/2013   Vaping Use    Vaping Use: Never used  "  Substance Use Topics    Alcohol use: Yes     Alcohol/week: 0.0 oz     Comment: 2x/wk    Drug use: No          Problem list, medications, and allergies reviewed by myself today in Epic.     Objective:     BP (!) 130/92 (BP Location: Left arm, Patient Position: Sitting, BP Cuff Size: Adult long)   Pulse (!) 108   Temp 37.4 °C (99.4 °F) (Temporal)   Resp 16   Ht 1.803 m (5' 11\")   Wt 118 kg (260 lb 2.3 oz)   SpO2 94%   BMI 36.28 kg/m²     Physical Exam  Vitals and nursing note reviewed.   Constitutional:       General: He is not in acute distress.     Appearance: Normal appearance. He is normal weight. He is not ill-appearing, toxic-appearing or diaphoretic.   HENT:      Head: Normocephalic and atraumatic.      Right Ear: Tympanic membrane, ear canal and external ear normal. There is no impacted cerumen.      Left Ear: Tympanic membrane, ear canal and external ear normal. There is no impacted cerumen.      Nose: Congestion present. No rhinorrhea.      Right Turbinates: Swollen.      Left Turbinates: Swollen.      Right Sinus: Maxillary sinus tenderness present.      Left Sinus: Maxillary sinus tenderness present.      Mouth/Throat:      Mouth: Mucous membranes are moist.      Pharynx: Oropharynx is clear. No oropharyngeal exudate or posterior oropharyngeal erythema.   Cardiovascular:      Rate and Rhythm: Normal rate and regular rhythm.      Pulses: Normal pulses.      Heart sounds: Normal heart sounds. No murmur heard.     No friction rub. No gallop.   Pulmonary:      Effort: Pulmonary effort is normal. No respiratory distress.      Breath sounds: Normal breath sounds. No stridor. No wheezing, rhonchi or rales.   Chest:      Chest wall: No tenderness.   Musculoskeletal:      Cervical back: Normal range of motion and neck supple. No tenderness.   Lymphadenopathy:      Cervical: No cervical adenopathy.   Skin:     General: Skin is warm and dry.      Capillary Refill: Capillary refill takes less than 2 seconds. "   Neurological:      General: No focal deficit present.      Mental Status: He is alert and oriented to person, place, and time. Mental status is at baseline.   Psychiatric:         Mood and Affect: Mood normal.         Behavior: Behavior normal.         Thought Content: Thought content normal.         Judgment: Judgment normal.         Assessment/Plan:     Diagnosis and associated orders:   1. Rhinosinusitis  doxycycline (VIBRAMYCIN) 100 MG Tab             Comments/MDM:   Pt is clinically stable at today's acute urgent care visit.  No acute distress noted. Appropriate for outpatient management at this time.     Acute problem.  Discussed using intranasal Flonase, intranasal saline irrigations, humidifier, alternating Tylenol and ibuprofen as needed for pain, and daily nondrowsy antihistamine such as Claritin or Zyrtec with patient.  Contingent antibiotic written for symptoms that fail to improve.  Patient is agreeable with this plan of care and verbalizes good understanding.           Discussed DDx, management options (risks,benefits, and alternatives to planned treatment), natural progression and supportive care.  Expressed understanding and the treatment plan was agreed upon. Questions were encouraged and answered   Return to urgent care prn if new or worsening sx or if there is no improvement in condition prn.    Educated in Red flags and indications to immediately call 911 or present to the Emergency Department.   Advised the patient to follow-up with the primary care physician for recheck, reevaluation, and consideration of further management.    I personally reviewed prior external notes and test results pertinent to today's visit.  I have independently reviewed and interpreted all diagnostics ordered during this urgent care acute visit.       Please note that this dictation was created using voice recognition software. I have made a reasonable attempt to correct obvious errors, but I expect that there are  errors of grammar and possibly content that I did not discover before finalizing the note.    This note was electronically signed by JAMES Mcmanus

## 2024-04-04 ENCOUNTER — HOSPITAL ENCOUNTER (OUTPATIENT)
Dept: RADIOLOGY | Facility: MEDICAL CENTER | Age: 54
End: 2024-04-04
Payer: COMMERCIAL

## 2024-04-04 ENCOUNTER — OFFICE VISIT (OUTPATIENT)
Dept: URGENT CARE | Facility: PHYSICIAN GROUP | Age: 54
End: 2024-04-04
Payer: COMMERCIAL

## 2024-04-04 VITALS
HEART RATE: 86 BPM | OXYGEN SATURATION: 96 % | HEIGHT: 71 IN | BODY MASS INDEX: 35.59 KG/M2 | SYSTOLIC BLOOD PRESSURE: 138 MMHG | TEMPERATURE: 97.9 F | DIASTOLIC BLOOD PRESSURE: 90 MMHG | WEIGHT: 254.19 LBS | RESPIRATION RATE: 16 BRPM

## 2024-04-04 DIAGNOSIS — M79.644 PAIN OF RIGHT THUMB: ICD-10-CM

## 2024-04-04 DIAGNOSIS — R07.81 RIB PAIN: ICD-10-CM

## 2024-04-04 DIAGNOSIS — S29.011A INTERCOSTAL MUSCLE STRAIN, INITIAL ENCOUNTER: ICD-10-CM

## 2024-04-04 DIAGNOSIS — S66.419A STRAIN OF FASCIA OF INTRINSIC MUSCLE OF THUMB: ICD-10-CM

## 2024-04-04 PROCEDURE — 73130 X-RAY EXAM OF HAND: CPT | Mod: RT

## 2024-04-04 PROCEDURE — 71101 X-RAY EXAM UNILAT RIBS/CHEST: CPT | Mod: RT

## 2024-04-04 PROCEDURE — 99213 OFFICE O/P EST LOW 20 MIN: CPT

## 2024-04-04 PROCEDURE — 3075F SYST BP GE 130 - 139MM HG: CPT

## 2024-04-04 PROCEDURE — 3080F DIAST BP >= 90 MM HG: CPT

## 2024-04-04 RX ORDER — LISDEXAMFETAMINE DIMESYLATE CAPSULES 20 MG/1
20 CAPSULE ORAL DAILY
COMMUNITY
Start: 2024-03-27

## 2024-04-04 ASSESSMENT — ENCOUNTER SYMPTOMS
WEAKNESS: 0
WHEEZING: 0
DIZZINESS: 0
CHILLS: 0
ABDOMINAL PAIN: 0
PALPITATIONS: 0
SHORTNESS OF BREATH: 0
DIARRHEA: 0
SENSORY CHANGE: 0
VOMITING: 0
MYALGIAS: 0
STRIDOR: 0
NECK PAIN: 0
HEADACHES: 0
FALLS: 0
NAUSEA: 0
BLURRED VISION: 0
TINGLING: 0
SPUTUM PRODUCTION: 0
FEVER: 0
COUGH: 0

## 2024-04-04 ASSESSMENT — VISUAL ACUITY: OU: 1

## 2024-04-04 NOTE — PROGRESS NOTES
Subjective     Tima Alexandra is a 53 y.o. male who presents with right rib pain and right thumb pain x1.5 weeks.     HPI:   Tima is a 52yo male presenting for right rib pain and right thumb pain x1.5 weeks. Reports the pain occurred after boxing post punching a bag. There was immediate pain in the right thumb knuckle joint followed by pain in the right intercostal region. Intercostal pain is worse with movement and reproducible with spine rotation. Denies shortness of breath, vomiting, or chest pain. No history of DVT/PE. Denies swelling or ecchymosis to right intercostal region. Thumb pain is located in knuckle joint. Reports previous thumb injury years ago. Reports full ROM of right thumb and hand. No numbness/tingling or sensation loss.      Review of Systems   Constitutional:  Negative for chills, fever and malaise/fatigue.   Eyes:  Negative for blurred vision.   Respiratory:  Negative for cough, sputum production, shortness of breath, wheezing and stridor.    Cardiovascular:  Negative for chest pain, palpitations and leg swelling.   Gastrointestinal:  Negative for abdominal pain, diarrhea, nausea and vomiting.   Musculoskeletal:  Negative for falls, myalgias and neck pain.   Neurological:  Negative for dizziness, tingling, sensory change, weakness and headaches.     Past Medical History:   Diagnosis Date    ASTHMA     usually with colds    Bronchitis     Chest discomfort 6/24/2013    Dyslipidemia 6/24/2013    Elevated lipids 7/21/2011    Family history of premature CAD 6/24/2013    Father had MI at 45 yo    HTN (hypertension) 6/24/2013    Hypertension     Personal history of prior ablation treatment     PSVT (paroxysmal supraventricular tachycardia) ([facility])     Sleep apnea     cpap in use    Snoring     SVT (supraventricular tachycardia) ([facility]) 7/21/2011      Past Surgical History:   Procedure Laterality Date    RECOVERY  8/12/2011    Performed by SURGERY, CATH-RECOVERY at [facility]     OTHER  2009    vasectomy      Allergies: Pcn [penicillins]     Current Outpatient Medications:     lisdexamfetamine (VYVANSE) 20 MG Cap, Take 20 mg by mouth every day., Disp: , Rfl:     testosterone cypionate (DEPO-TESTOSTERONE) 200 MG/ML injection, ADMINISTER 1 ML IN THE MUSCLE EVERY 2 WEEKS, Disp: , Rfl:     DILTIAZem CD (CARDIZEM CD) 240 MG CAPSULE SR 24 HR, Take 240 mg by mouth., Disp: , Rfl:     Albuterol Sulfate 108 (90 BASE) MCG/ACT AEROSOL POWDER, BREATH ACTIVATED, Inhale 1-2 Puffs by mouth every 6 hours as needed (coughing, wheezing)., Disp: 1 Each, Rfl: 0    guaifenesin LA (MUCINEX) 600 MG TABLET SR 12 HR, Take 600 mg by mouth every 12 hours., Disp: , Rfl:     Omega-3 Fatty Acids (FISH OIL PO), Take 2,000 mg by mouth every day., Disp: , Rfl:     benzonatate (TESSALON) 100 MG Cap, Take 1 Capsule by mouth 3 times a day as needed for Cough. (Patient not taking: Reported on 1/1/2024), Disp: 60 Capsule, Rfl: 0    fluticasone (FLONASE) 50 MCG/ACT nasal spray, Spray 1 Spray in nose every day. (Patient not taking: Reported on 11/15/2023), Disp: 16 g, Rfl: 1    guaifenesin-codeine (ROBITUSSIN AC) Solution oral solution, Take 10 mL by mouth at bedtime as needed for Cough. (Patient not taking: Reported on 11/15/2023), Disp: 70 mL, Rfl: 0    ADVAIR DISKUS 250-50 MCG/DOSE AEROSOL POWDER, BREATH ACTIVATED, Inhale 1 Puff by mouth 2 times a day. (Patient not taking: Reported on 11/15/2023), Disp: 1 Inhaler, Rfl: 3    benzonatate (TESSALON) 100 MG Cap, Take 1 Cap by mouth 3 times a day as needed. (Patient not taking: Reported on 6/11/2020), Disp: 20 Cap, Rfl: 0    BENICAR 20 MG Tab, , Disp: , Rfl: 9    alprazolam (XANAX) 0.5 MG Tab, , Disp: , Rfl: 0    alprazolam (XANAX) 0.25 MG TABS, Take 1 Tab by mouth as needed for Anxiety. (Patient not taking: Reported on 11/15/2023), Disp: 10 Tab, Rfl: 0    atorvastatin (LIPITOR) 10 MG TABS, Take 10 mg by mouth every evening. (Patient not taking: Reported on 11/15/2023), Disp: , Rfl:  "    MELATONIN, by Does not apply route every 48 hours as needed. (Patient not taking: Reported on 11/15/2023), Disp: , Rfl:     Zolpidem Tartrate (AMBIEN PO), Take  by mouth. (Patient not taking: Reported on 11/15/2023), Disp: , Rfl:      Social History     Tobacco Use    Smoking status: Former     Current packs/day: 0.00     Average packs/day: 0.3 packs/day for 3.0 years (0.8 ttl pk-yrs)     Types: Cigarettes     Start date: 4/24/2010     Quit date: 4/24/2013     Years since quitting: 10.9    Smokeless tobacco: Current     Types: Chew     Last attempt to quit: 4/24/2013   Vaping Use    Vaping Use: Never used   Substance Use Topics    Alcohol use: Yes     Alcohol/week: 0.0 oz     Comment: 2x/wk    Drug use: No      History reviewed. No pertinent family history.     Medications, Allergies, and current problem list reviewed today in Epic.      Objective     BP (!) 138/90   Pulse 86   Temp 36.6 °C (97.9 °F) (Temporal)   Resp 16   Ht 1.803 m (5' 11\")   Wt 115 kg (254 lb 3.1 oz)   SpO2 96%   BMI 35.45 kg/m²      Physical Exam  Vitals reviewed.   Constitutional:       General: He is not in acute distress.  HENT:      Nose: Nose normal.   Eyes:      General: Vision grossly intact. Gaze aligned appropriately. No visual field deficit.     Extraocular Movements: Extraocular movements intact.      Conjunctiva/sclera: Conjunctivae normal.      Pupils: Pupils are equal, round, and reactive to light.   Cardiovascular:      Rate and Rhythm: Normal rate and regular rhythm.      Pulses: Normal pulses.           Radial pulses are 2+ on the right side and 2+ on the left side.      Heart sounds: Normal heart sounds.   Pulmonary:      Effort: Pulmonary effort is normal. No tachypnea, accessory muscle usage, prolonged expiration, respiratory distress or retractions.      Breath sounds: Normal breath sounds. No stridor, decreased air movement or transmitted upper airway sounds. No wheezing, rhonchi or rales.   Chest:      Chest " wall: Tenderness present. No mass, deformity, swelling, crepitus or edema.      Comments: Reproducible tenderness to palpation to right lateral intercostal space midclavicular line.    Strength 5/5 to bilateral upper extremities.   No midline spinal tenderness.   No ecchymosis present.     Neurovascular intact. Peripheral pulses without abnormality or indication of diminished blood flow.  No crepitus on pulmonary examination. Lung sounds present and clear throughout all lobes.    Musculoskeletal:      Right wrist: No swelling, deformity, tenderness, bony tenderness, snuff box tenderness or crepitus. Normal range of motion. Normal pulse.      Left wrist: Normal.      Right hand: Tenderness present. No swelling, deformity or bony tenderness. Normal range of motion. Normal strength. Normal sensation. There is no disruption of two-point discrimination. Normal capillary refill. Normal pulse.      Left hand: Normal.      Cervical back: Full passive range of motion without pain, normal range of motion and neck supple. No swelling, deformity, erythema, rigidity, spasms, tenderness, bony tenderness or crepitus. No pain with movement. Normal range of motion.      Thoracic back: No swelling, edema, deformity, spasms, tenderness or bony tenderness. Normal range of motion.      Lumbar back: Tenderness present. No swelling, deformity, spasms or bony tenderness. Normal range of motion.      Comments: Tenderness to palpation to right lumbar musculature.   Strength 5/5 to bilateral lower extremities.   No midline tenderness.      Tenderness and to palpation to right thumb knuckle. No bony tenderness in right wrist or fingers. Full ROM of right hand, digits, and wrist. Sensation intact, upper extremity strength 5/5. Distal neurovascular intact. Cap refill <2sec.    Skin:     General: Skin is warm and dry.      Capillary Refill: Capillary refill takes less than 2 seconds.   Neurological:      Mental Status: He is alert. Mental status  is at baseline.   Psychiatric:         Mood and Affect: Mood normal.         Behavior: Behavior normal.         Thought Content: Thought content normal.       [id number] (WITH 1-VIEW CXR) RIGHT    Result Date: 4/4/2024 4/4/2024 9:27 AM HISTORY/REASON FOR EXAM:  Pain Following Trauma.  RIGHT upper back strain during exercise 10 days ago.  RIGHT lower chest wall pain. TECHNIQUE/EXAM DESCRIPTION AND NUMBER OF VIEWS:  5 images of the right ribs and chest. COMPARISON: Chest x-ray 11/15/2023 FINDINGS: Cardiomediastinal contour is within normal limits. No focal pulmonary consolidation. No pleural fluid collection or pneumothorax. Degenerative change of thoracic spine. No displaced RIGHT rib fracture. Old healed lateral RIGHT 6th rib fracture.     1.  No evidence of acute intrathoracic injury or cardiopulmonary disease. 2.  No acute RIGHT rib findings.    DX-HAND 3+ RIGHT    Result Date: 4/4/2024 4/4/2024 9:27 AM HISTORY/REASON FOR EXAM:  Pain/Deformity Following Trauma. RIGHT thumb pain after boxing workout 10 days ago. TECHNIQUE/EXAM DESCRIPTION AND NUMBER OF VIEWS:  3 views of the RIGHT hand. COMPARISON: 7/31/2007 FINDINGS: No focal soft tissue swelling. No fracture or dislocation. Minimal narrowing of the interphalangeal joints. Minimal narrowing of radiocarpal joint.     1.  No fracture or dislocation of RIGHT hand. 2.  Minimal degenerative changes.       Assessment & Plan     1. Pain of right thumb   - DX-HAND 3+ RIGHT; Future    2. Rib pain   - [id number] (WITH 1-VIEW CXR) RIGHT; Future    3. Intercostal muscle strain, initial encounter     4. Strain of fascia of intrinsic muscle of thumb        MDM/Comments:   Patient is overall very well-appearing, no acute distress, and normal vital signs. Suspicions for acute emergent pathology are low. Pulmonary exam without abnormality. Full ROM in all extremities. Imaging of ribs and chest demonstrates healed lateral RIGHT 6th rib fracture with no associated  pleural fluid or pneumothorax. History, examination, and diagnostics consistent with intercostal muscle strain.     Patient without shortness of breath, tachycardia, chest pain, palpitations, or lower extremity swelling or pain, making pulmonary embolism unlikely.   Discussion with patient regarding signs and symptoms of pulmonary embolism. Patient verbalizes understanding of when to present to emergency room or call 911.       Differential diagnosis, natural history, supportive care, and indications for immediate follow-up discussed.       Recommended supportive treatment at home:    Treatment of as above and initial rest with no heavy lifting, stooping, or strenuous activity. Massage, ice and/or heat which ever feels better, and Tylenol per manufacture's instructions. Encouraged walking, stretching, and range of motion exercises as tolerated. Avoid sitting or laying down for long periods of time except for at night during sleep.   Follow up with your PCP or return to urgent care if symptoms not improving in 1-2 weeks.       Illness progression and alarm symptoms discussed with patient, emphasizing low threshold for returning to clinic/emergency department for worsening symptoms or shortness of breath/difficulty breathing. Patient is agreeable to the plan and verbalizes understanding, and will follow up if warranted.                         Electronically signed by JAMES Biggs

## 2024-11-24 ENCOUNTER — OFFICE VISIT (OUTPATIENT)
Dept: URGENT CARE | Facility: PHYSICIAN GROUP | Age: 54
End: 2024-11-24
Payer: COMMERCIAL

## 2024-11-24 VITALS
BODY MASS INDEX: 35.29 KG/M2 | TEMPERATURE: 98.8 F | DIASTOLIC BLOOD PRESSURE: 110 MMHG | HEIGHT: 71 IN | SYSTOLIC BLOOD PRESSURE: 168 MMHG | HEART RATE: 96 BPM | WEIGHT: 252.1 LBS | RESPIRATION RATE: 14 BRPM | OXYGEN SATURATION: 97 %

## 2024-11-24 DIAGNOSIS — S05.12XA PERIORBITAL CONTUSION OF LEFT EYE, INITIAL ENCOUNTER: ICD-10-CM

## 2024-11-24 DIAGNOSIS — H11.32 SUBCONJUNCTIVAL HEMORRHAGE OF LEFT EYE: ICD-10-CM

## 2024-11-24 PROCEDURE — 3080F DIAST BP >= 90 MM HG: CPT | Performed by: PHYSICIAN ASSISTANT

## 2024-11-24 PROCEDURE — 99213 OFFICE O/P EST LOW 20 MIN: CPT | Performed by: PHYSICIAN ASSISTANT

## 2024-11-24 PROCEDURE — 3077F SYST BP >= 140 MM HG: CPT | Performed by: PHYSICIAN ASSISTANT

## 2024-11-24 RX ORDER — TADALAFIL 5 MG/1
20 TABLET ORAL
COMMUNITY

## 2024-11-24 ASSESSMENT — VISUAL ACUITY: OU: 1
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
## 2024-12-12 ENCOUNTER — HOSPITAL ENCOUNTER (OUTPATIENT)
Dept: LAB | Facility: MEDICAL CENTER | Age: 54
End: 2024-12-12
Attending: FAMILY MEDICINE
Payer: COMMERCIAL

## 2024-12-12 LAB
APPEARANCE UR: ABNORMAL
BACTERIA #/AREA URNS HPF: ABNORMAL /HPF
BASOPHILS # BLD AUTO: 0.8 % (ref 0–1.8)
BASOPHILS # BLD: 0.07 K/UL (ref 0–0.12)
BILIRUB UR QL STRIP.AUTO: NEGATIVE
CASTS URNS QL MICRO: ABNORMAL /LPF (ref 0–2)
COLOR UR: ABNORMAL
EOSINOPHIL # BLD AUTO: 0.16 K/UL (ref 0–0.51)
EOSINOPHIL NFR BLD: 1.8 % (ref 0–6.9)
EPITHELIAL CELLS 1715: ABNORMAL /HPF (ref 0–5)
ERYTHROCYTE [DISTWIDTH] IN BLOOD BY AUTOMATED COUNT: 42.2 FL (ref 35.9–50)
ERYTHROCYTE [SEDIMENTATION RATE] IN BLOOD BY WESTERGREN METHOD: 5 MM/HOUR (ref 0–20)
GLUCOSE UR STRIP.AUTO-MCNC: NEGATIVE MG/DL
HCT VFR BLD AUTO: 48.8 % (ref 42–52)
HGB BLD-MCNC: 17.4 G/DL (ref 14–18)
IMM GRANULOCYTES # BLD AUTO: 0.03 K/UL (ref 0–0.11)
IMM GRANULOCYTES NFR BLD AUTO: 0.3 % (ref 0–0.9)
KETONES UR STRIP.AUTO-MCNC: ABNORMAL MG/DL
LEUKOCYTE ESTERASE UR QL STRIP.AUTO: ABNORMAL
LYMPHOCYTES # BLD AUTO: 2.91 K/UL (ref 1–4.8)
LYMPHOCYTES NFR BLD: 33.3 % (ref 22–41)
MCH RBC QN AUTO: 34.1 PG (ref 27–33)
MCHC RBC AUTO-ENTMCNC: 35.7 G/DL (ref 32.3–36.5)
MCV RBC AUTO: 95.5 FL (ref 81.4–97.8)
MICRO URNS: ABNORMAL
MONOCYTES # BLD AUTO: 0.72 K/UL (ref 0–0.85)
MONOCYTES NFR BLD AUTO: 8.2 % (ref 0–13.4)
MUCOUS THREADS URNS QL MICRO: PRESENT /HPF
NEUTROPHILS # BLD AUTO: 4.85 K/UL (ref 1.82–7.42)
NEUTROPHILS NFR BLD: 55.6 % (ref 44–72)
NITRITE UR QL STRIP.AUTO: NEGATIVE
NRBC # BLD AUTO: 0 K/UL
NRBC BLD-RTO: 0 /100 WBC (ref 0–0.2)
PH UR STRIP.AUTO: 5.5 [PH] (ref 5–8)
PLATELET # BLD AUTO: 287 K/UL (ref 164–446)
PMV BLD AUTO: 9.9 FL (ref 9–12.9)
PROT UR QL STRIP: 30 MG/DL
RBC # BLD AUTO: 5.11 M/UL (ref 4.7–6.1)
RBC # URNS HPF: ABNORMAL /HPF (ref 0–2)
RBC UR QL AUTO: ABNORMAL
SP GR UR STRIP.AUTO: 1.03
UROBILINOGEN UR STRIP.AUTO-MCNC: 1 EU/DL
WBC # BLD AUTO: 8.7 K/UL (ref 4.8–10.8)
WBC #/AREA URNS HPF: ABNORMAL /HPF

## 2024-12-12 PROCEDURE — 84153 ASSAY OF PSA TOTAL: CPT

## 2024-12-12 PROCEDURE — 81001 URINALYSIS AUTO W/SCOPE: CPT

## 2024-12-12 PROCEDURE — 80053 COMPREHEN METABOLIC PANEL: CPT

## 2024-12-12 PROCEDURE — 84443 ASSAY THYROID STIM HORMONE: CPT

## 2024-12-12 PROCEDURE — 86140 C-REACTIVE PROTEIN: CPT

## 2024-12-12 PROCEDURE — 85025 COMPLETE CBC W/AUTO DIFF WBC: CPT

## 2024-12-12 PROCEDURE — 80061 LIPID PANEL: CPT

## 2024-12-12 PROCEDURE — 84439 ASSAY OF FREE THYROXINE: CPT

## 2024-12-12 PROCEDURE — 84403 ASSAY OF TOTAL TESTOSTERONE: CPT

## 2024-12-12 PROCEDURE — 83036 HEMOGLOBIN GLYCOSYLATED A1C: CPT

## 2024-12-12 PROCEDURE — 85652 RBC SED RATE AUTOMATED: CPT

## 2024-12-12 PROCEDURE — 36415 COLL VENOUS BLD VENIPUNCTURE: CPT

## 2024-12-13 LAB
ALBUMIN SERPL BCP-MCNC: 4.7 G/DL (ref 3.2–4.9)
ALBUMIN/GLOB SERPL: 1.5 G/DL
ALP SERPL-CCNC: 68 U/L (ref 30–99)
ALT SERPL-CCNC: 54 U/L (ref 2–50)
ANION GAP SERPL CALC-SCNC: 15 MMOL/L (ref 7–16)
AST SERPL-CCNC: 43 U/L (ref 12–45)
BILIRUB SERPL-MCNC: 1.3 MG/DL (ref 0.1–1.5)
BUN SERPL-MCNC: 14 MG/DL (ref 8–22)
CALCIUM ALBUM COR SERPL-MCNC: 8.8 MG/DL (ref 8.5–10.5)
CALCIUM SERPL-MCNC: 9.4 MG/DL (ref 8.5–10.5)
CHLORIDE SERPL-SCNC: 99 MMOL/L (ref 96–112)
CHOLEST SERPL-MCNC: 180 MG/DL (ref 100–199)
CO2 SERPL-SCNC: 22 MMOL/L (ref 20–33)
CREAT SERPL-MCNC: 1.05 MG/DL (ref 0.5–1.4)
CRP SERPL HS-MCNC: <0.3 MG/DL (ref 0–0.75)
EST. AVERAGE GLUCOSE BLD GHB EST-MCNC: 108 MG/DL
FASTING STATUS PATIENT QL REPORTED: NORMAL
GFR SERPLBLD CREATININE-BSD FMLA CKD-EPI: 84 ML/MIN/1.73 M 2
GLOBULIN SER CALC-MCNC: 3.2 G/DL (ref 1.9–3.5)
GLUCOSE SERPL-MCNC: 96 MG/DL (ref 65–99)
HBA1C MFR BLD: 5.4 % (ref 4–5.6)
HDLC SERPL-MCNC: 39 MG/DL
LDLC SERPL CALC-MCNC: 93 MG/DL
POTASSIUM SERPL-SCNC: 3.8 MMOL/L (ref 3.6–5.5)
PROT SERPL-MCNC: 7.9 G/DL (ref 6–8.2)
PSA SERPL DL<=0.01 NG/ML-MCNC: 3.27 NG/ML (ref 0–4)
SODIUM SERPL-SCNC: 136 MMOL/L (ref 135–145)
T4 FREE SERPL-MCNC: 1.26 NG/DL (ref 0.93–1.7)
TESTOST SERPL-MCNC: 352 NG/DL (ref 175–781)
TRIGL SERPL-MCNC: 241 MG/DL (ref 0–149)
TSH SERPL DL<=0.005 MIU/L-ACNC: 5.7 UIU/ML (ref 0.38–5.33)

## 2025-07-09 ENCOUNTER — HOSPITAL ENCOUNTER (OUTPATIENT)
Dept: RADIOLOGY | Facility: MEDICAL CENTER | Age: 55
End: 2025-07-09
Payer: COMMERCIAL

## 2025-07-09 ENCOUNTER — OFFICE VISIT (OUTPATIENT)
Dept: URGENT CARE | Facility: PHYSICIAN GROUP | Age: 55
End: 2025-07-09
Payer: COMMERCIAL

## 2025-07-09 VITALS
HEART RATE: 102 BPM | RESPIRATION RATE: 20 BRPM | DIASTOLIC BLOOD PRESSURE: 84 MMHG | BODY MASS INDEX: 34.95 KG/M2 | WEIGHT: 249.67 LBS | HEIGHT: 71 IN | SYSTOLIC BLOOD PRESSURE: 128 MMHG | OXYGEN SATURATION: 95 % | TEMPERATURE: 98.6 F

## 2025-07-09 DIAGNOSIS — R06.2 WHEEZING: ICD-10-CM

## 2025-07-09 DIAGNOSIS — R05.1 ACUTE COUGH: Primary | ICD-10-CM

## 2025-07-09 DIAGNOSIS — R05.1 ACUTE COUGH: ICD-10-CM

## 2025-07-09 DIAGNOSIS — J22 LOWER RESPIRATORY TRACT INFECTION: ICD-10-CM

## 2025-07-09 DIAGNOSIS — J45.21 MILD INTERMITTENT ASTHMA WITH ACUTE EXACERBATION: ICD-10-CM

## 2025-07-09 PROCEDURE — 71046 X-RAY EXAM CHEST 2 VIEWS: CPT

## 2025-07-09 PROCEDURE — 3074F SYST BP LT 130 MM HG: CPT

## 2025-07-09 PROCEDURE — 99214 OFFICE O/P EST MOD 30 MIN: CPT | Mod: 25

## 2025-07-09 PROCEDURE — 94640 AIRWAY INHALATION TREATMENT: CPT

## 2025-07-09 PROCEDURE — 3079F DIAST BP 80-89 MM HG: CPT

## 2025-07-09 RX ORDER — ALBUTEROL SULFATE 0.83 MG/ML
2.5 SOLUTION RESPIRATORY (INHALATION) EVERY 6 HOURS PRN
Qty: 30 EACH | Refills: 0 | Status: SHIPPED | OUTPATIENT
Start: 2025-07-09

## 2025-07-09 RX ORDER — IPRATROPIUM BROMIDE AND ALBUTEROL SULFATE 2.5; .5 MG/3ML; MG/3ML
3 SOLUTION RESPIRATORY (INHALATION) ONCE
Status: COMPLETED | OUTPATIENT
Start: 2025-07-09 | End: 2025-07-09

## 2025-07-09 RX ORDER — PREDNISONE 20 MG/1
40 TABLET ORAL DAILY
Qty: 10 TABLET | Refills: 0 | Status: SHIPPED | OUTPATIENT
Start: 2025-07-09 | End: 2025-07-14

## 2025-07-09 RX ORDER — DEXTROMETHORPHAN HYDROBROMIDE AND PROMETHAZINE HYDROCHLORIDE 15; 6.25 MG/5ML; MG/5ML
5 SYRUP ORAL NIGHTLY PRN
Qty: 80 ML | Refills: 0 | Status: SHIPPED | OUTPATIENT
Start: 2025-07-09 | End: 2025-07-14

## 2025-07-09 RX ORDER — DOXYCYCLINE 100 MG/1
100 CAPSULE ORAL 2 TIMES DAILY
Qty: 14 CAPSULE | Refills: 0 | Status: SHIPPED | OUTPATIENT
Start: 2025-07-09 | End: 2025-07-16

## 2025-07-09 RX ADMIN — IPRATROPIUM BROMIDE AND ALBUTEROL SULFATE 3 ML: 2.5; .5 SOLUTION RESPIRATORY (INHALATION) at 10:57

## 2025-07-09 ASSESSMENT — ENCOUNTER SYMPTOMS
DIARRHEA: 0
PALPITATIONS: 0
EYE REDNESS: 0
WEAKNESS: 0
PHOTOPHOBIA: 0
VOMITING: 0
SORE THROAT: 1
MYALGIAS: 1
NAUSEA: 0
HEADACHES: 1
WHEEZING: 1
EYE DISCHARGE: 0
EYE PAIN: 0
SENSORY CHANGE: 0
DIZZINESS: 0
FOCAL WEAKNESS: 0
SINUS PAIN: 1
TINGLING: 0
BLURRED VISION: 0
SHORTNESS OF BREATH: 0
DOUBLE VISION: 0
NECK PAIN: 0
ABDOMINAL PAIN: 0
CHILLS: 1
STRIDOR: 0
SPUTUM PRODUCTION: 1
COUGH: 1

## 2025-07-09 ASSESSMENT — FIBROSIS 4 INDEX: FIB4 SCORE: 1.12

## 2025-07-09 ASSESSMENT — VISUAL ACUITY: OU: 1

## 2025-07-09 NOTE — PROGRESS NOTES
Subjective     Tima Alexandra is a 54 y.o. male who presents with Cough (X5 days with fever, chills, fatigue, and loss of appetite.)    HPI:   Tima is a 53yo male presenting for cough x5 days. Pertinent history of asthma. Reports associated wheezing, loss of appetite, fatigue, chills, body aches, sore throat, nasal congestion, sinus pressure, and intermittent pressure headache. Denies abdominal pain, vomiting, or diarrhea. Denies increased work of breathing. Denies dysphagia or difficulty managing oral secretions. He has attempted nasal saline, Albuterol nebulization, and Mucinex for relief.        Review of Systems   Constitutional:  Positive for chills and malaise/fatigue.   HENT:  Positive for congestion, sinus pain and sore throat. Negative for ear discharge and ear pain.    Eyes:  Negative for blurred vision, double vision, photophobia, pain, discharge and redness.   Respiratory:  Positive for cough, sputum production and wheezing. Negative for shortness of breath and stridor.    Cardiovascular:  Negative for chest pain, palpitations and leg swelling.   Gastrointestinal:  Negative for abdominal pain, diarrhea, nausea and vomiting.   Musculoskeletal:  Positive for myalgias. Negative for neck pain.   Skin:  Negative for rash.   Neurological:  Positive for headaches. Negative for dizziness, tingling, sensory change, focal weakness and weakness.     Past Medical History:  Diagnosis Date    ASTHMA     usually with colds    Bronchitis     Chest discomfort 6/24/2013    Dyslipidemia 6/24/2013    Elevated lipids 7/21/2011    Family history of premature CAD 6/24/2013    Father had MI at 43 yo    HTN (hypertension) 6/24/2013    Hypertension     Personal history of prior ablation treatment     PSVT (paroxysmal supraventricular tachycardia) ([facility])     Sleep apnea     cpap in use    Snoring     SVT (supraventricular tachycardia) ([facility]) 7/21/2011     Past Surgical History:  Procedure Laterality Date    RECOVERY  8/12/2011  "   Performed by SURGERY, CATH-RECOVERY at [facility]    OTHER  2009    vasectomy     Allergies: Pcn [penicillins]     Current Outpatient Medications:     tadalafil (CIALIS) 5 MG tablet, 20 mg., Disp: , Rfl:     lisdexamfetamine (VYVANSE) 20 MG Cap, Take 20 mg by mouth every day., Disp: , Rfl:     testosterone cypionate (DEPO-TESTOSTERONE) 200 MG/ML injection, ADMINISTER 1 ML IN THE MUSCLE EVERY 2 WEEKS, Disp: , Rfl:     DILTIAZem CD (CARDIZEM CD) 240 MG CAPSULE SR 24 HR, Take 240 mg by mouth., Disp: , Rfl:     Omega-3 Fatty Acids (FISH OIL PO), Take 2,000 mg by mouth every day., Disp: , Rfl:     Social History  Tobacco Use    Smoking status: Former     Current packs/day: 0.00     Average packs/day: 0.3 packs/day for 3.0 years (0.8 ttl pk-yrs)     Types: Cigarettes     Start date: 2010     Quit date: 2013     Years since quittin.2    Smokeless tobacco: Current     Types: Chew     Last attempt to quit: 2013   Vaping Use    Vaping status: Never Used   Substance Use Topics    Alcohol use: Yes     Alcohol/week: 0.0 oz     Comment: 2x/wk    Drug use: No     History reviewed. No pertinent family history.     Medications, Allergies, and current problem list reviewed today in Epic.      Objective     /84   Pulse (!) 102   Temp 37 °C (98.6 °F) (Temporal)   Resp 20   Ht 1.803 m (5' 11\")   Wt 113 kg (249 lb 10.7 oz)   SpO2 95%   BMI 34.82 kg/m²      Physical Exam  Vitals reviewed.   Constitutional:       General: He is not in acute distress.  HENT:      Head: No right periorbital erythema or left periorbital erythema.      Jaw: There is normal jaw occlusion. No tenderness, swelling, pain on movement or malocclusion.      Right Ear: Tympanic membrane, ear canal and external ear normal.      Left Ear: Tympanic membrane, ear canal and external ear normal.      Nose: Congestion present.      Right Sinus: No maxillary sinus tenderness or frontal sinus tenderness.      Left Sinus: " No maxillary sinus tenderness or frontal sinus tenderness.      Mouth/Throat:      Lips: No lesions.      Mouth: Mucous membranes are moist. No oral lesions or angioedema.      Tongue: No lesions. Tongue does not deviate from midline.      Palate: No mass and lesions.      Pharynx: Uvula midline. Posterior oropharyngeal erythema present. No oropharyngeal exudate or uvula swelling.      Tonsils: No tonsillar abscesses.   Eyes:      General: Vision grossly intact. Gaze aligned appropriately. No visual field deficit.     Extraocular Movements: Extraocular movements intact.      Conjunctiva/sclera: Conjunctivae normal.      Pupils: Pupils are equal, round, and reactive to light.      Right eye: Pupil is round, reactive and not sluggish.      Left eye: Pupil is round, reactive and not sluggish.      Funduscopic exam:     Right eye: Red reflex present.         Left eye: Red reflex present.  Neck:      Trachea: Trachea normal. No abnormal tracheal secretions or tracheal deviation.   Cardiovascular:      Rate and Rhythm: Normal rate and regular rhythm.      Pulses: Normal pulses.      Heart sounds: Normal heart sounds.   Pulmonary:      Effort: Pulmonary effort is normal. No tachypnea, accessory muscle usage, prolonged expiration, respiratory distress or retractions.      Breath sounds: Decreased air movement present. No stridor or transmitted upper airway sounds. Examination of the right-upper field reveals wheezing. Examination of the left-upper field reveals wheezing. Examination of the right-middle field reveals wheezing. Examination of the left-middle field reveals wheezing. Examination of the left-lower field reveals rales. Wheezing (improved s/p DuoNeb treatment) and rales present. No rhonchi.   Musculoskeletal:         General: Normal range of motion.      Cervical back: Full passive range of motion without pain, normal range of motion and neck supple. No erythema, rigidity, tenderness or crepitus. No pain with  movement. Normal range of motion.   Lymphadenopathy:      Cervical: No cervical adenopathy.   Skin:     General: Skin is warm and dry.      Findings: No rash.   Neurological:      Mental Status: He is alert. Mental status is at baseline.      Sensory: Sensation is intact.      Motor: Motor function is intact.      Coordination: Coordination is intact.      Gait: Gait is intact.   Psychiatric:         Mood and Affect: Mood normal.         Behavior: Behavior normal.         Thought Content: Thought content normal.       DX-CHEST-2 VIEWS  Result Date: 7/9/2025 7/9/2025 10:45 AM HISTORY/REASON FOR EXAM:  Cough TECHNIQUE/EXAM DESCRIPTION AND NUMBER OF VIEWS: Two views of the chest. COMPARISON:  None. FINDINGS: No pulmonary infiltrates or consolidations are noted. No pleural effusions, no pneumothorax are appreciated. Normal cardiopericardial silhouette.     1. No active cardiopulmonary abnormalities are identified.    Assessment & Plan    1. Acute cough (Primary)   - DX-CHEST-2 VIEWS; Future  - promethazine-dextromethorphan (PROMETHAZINE-DM) 6.25-15 MG/5ML syrup; Take 5 mL by mouth at bedtime as needed for Cough for up to 5 days.  Dispense: 80 mL; Refill: 0    2. Wheezing   - ipratropium-albuterol (DUONEB) nebulizer solution    3. Mild intermittent asthma with acute exacerbation   - albuterol (PROVENTIL) 2.5mg/3ml Nebu Soln solution for nebulization; Take 3 mL by nebulization every 6 hours as needed for Shortness of Breath.  Dispense: 30 Each; Refill: 0  - predniSONE (DELTASONE) 20 MG Tab; Take 2 Tablets by mouth every day for 5 days.  Dispense: 10 Tablet; Refill: 0    4. Lower respiratory tract infection   - doxycycline (MONODOX) 100 MG capsule; Take 1 Capsule by mouth 2 times a day for 7 days.  Dispense: 14 Capsule; Refill: 0       MDM/Comments:   The patient is presenting with productive cough, congestion, shortness of breath and wheezing. Over the last few days he has developed chills, body aches, and fatigue. He is  eating and drinking normal without vomiting or diarrhea. He denies chest pain, palpitations, leg swelling or calf tenderness. He has a deep painful productive bronchial cough with decreased breath sounds and wheezing. Based on his history and examination findings he will be treated with doxycyline x7 days for lower respiratory tract infection. Albuterol and prednisone will also be prescribed for airway inflammation and dyspnea. Patient was treated for lower respiratory tract infection based on symptoms, duration, progression and failure to improve with OTC/conservative measures.      Illness progression and alarm symptoms discussed with patient, emphasizing low threshold for returning to clinic/emergency department for worsening symptoms. Patient is agreeable to the plan and verbalizes understanding, and will follow up if warranted.        Symptomatic treatment with:  -Tylenol   - Warm tea with honey   - Sleep propped on pillows   - Cool mist humidifier   - Staying hydrated keeps mucus thin  - Flonase   - Nasal saline or Neti pot     Follow up with primary care provider. Urgently for worsening symptoms, persistent fevers, facial swelling, visual changes, weakness, elevated heart rate, stiff neck, prolonged cough, persistent wheezing, leg swelling, or any other concerns. Seek emergency medical care immediately for: Trouble breathing, persistent pain or pressure in the chest, confusion, inability to wake or stay awake, bluish lips or face, persistent tachycardia (fast heart rate), prolonged dizziness, persistent high grade fevers.       Differential diagnosis, natural history, supportive care, and indications for immediate follow-up discussed.       Follow-up as needed if symptoms worsen or fail to improve to PCP, Urgent care or Emergency Room.        I personally reviewed prior external notes and test results pertinent to today's visit.  I have independently reviewed and interpreted all diagnostics ordered during  this urgent care visit.                                           Electronically signed by JAMES Biggs